# Patient Record
Sex: MALE | Race: BLACK OR AFRICAN AMERICAN | NOT HISPANIC OR LATINO | URBAN - METROPOLITAN AREA
[De-identification: names, ages, dates, MRNs, and addresses within clinical notes are randomized per-mention and may not be internally consistent; named-entity substitution may affect disease eponyms.]

---

## 2020-08-23 ENCOUNTER — INPATIENT (INPATIENT)
Facility: HOSPITAL | Age: 50
LOS: 3 days | Discharge: HOME | End: 2020-08-27
Attending: INTERNAL MEDICINE | Admitting: INTERNAL MEDICINE
Payer: MEDICAID

## 2020-08-23 VITALS
DIASTOLIC BLOOD PRESSURE: 98 MMHG | WEIGHT: 175.05 LBS | OXYGEN SATURATION: 98 % | RESPIRATION RATE: 16 BRPM | HEART RATE: 86 BPM | TEMPERATURE: 98 F | SYSTOLIC BLOOD PRESSURE: 152 MMHG

## 2020-08-23 DIAGNOSIS — I10 ESSENTIAL (PRIMARY) HYPERTENSION: ICD-10-CM

## 2020-08-23 DIAGNOSIS — G83.4 CAUDA EQUINA SYNDROME: ICD-10-CM

## 2020-08-23 DIAGNOSIS — Z98.1 ARTHRODESIS STATUS: ICD-10-CM

## 2020-08-23 DIAGNOSIS — M54.5 LOW BACK PAIN: ICD-10-CM

## 2020-08-23 DIAGNOSIS — M48.061 SPINAL STENOSIS, LUMBAR REGION WITHOUT NEUROGENIC CLAUDICATION: ICD-10-CM

## 2020-08-23 DIAGNOSIS — M51.16 INTERVERTEBRAL DISC DISORDERS WITH RADICULOPATHY, LUMBAR REGION: ICD-10-CM

## 2020-08-23 DIAGNOSIS — M54.9 DORSALGIA, UNSPECIFIED: ICD-10-CM

## 2020-08-23 DIAGNOSIS — M51.9 UNSPECIFIED THORACIC, THORACOLUMBAR AND LUMBOSACRAL INTERVERTEBRAL DISC DISORDER: ICD-10-CM

## 2020-08-23 LAB
AMPHET UR-MCNC: NEGATIVE — SIGNIFICANT CHANGE UP
ANION GAP SERPL CALC-SCNC: 11 MMOL/L — SIGNIFICANT CHANGE UP (ref 5–17)
APTT BLD: 24.9 SEC — LOW (ref 27.5–35.5)
BARBITURATES UR SCN-MCNC: NEGATIVE — SIGNIFICANT CHANGE UP
BENZODIAZ UR-MCNC: NEGATIVE — SIGNIFICANT CHANGE UP
BUN SERPL-MCNC: 13 MG/DL — SIGNIFICANT CHANGE UP (ref 7–23)
CALCIUM SERPL-MCNC: 8.8 MG/DL — SIGNIFICANT CHANGE UP (ref 8.5–10.1)
CHLORIDE SERPL-SCNC: 108 MMOL/L — SIGNIFICANT CHANGE UP (ref 96–108)
CO2 SERPL-SCNC: 23 MMOL/L — SIGNIFICANT CHANGE UP (ref 22–31)
COCAINE METAB.OTHER UR-MCNC: NEGATIVE — SIGNIFICANT CHANGE UP
CREAT SERPL-MCNC: 1.06 MG/DL — SIGNIFICANT CHANGE UP (ref 0.5–1.3)
GLUCOSE SERPL-MCNC: 103 MG/DL — HIGH (ref 70–99)
HCT VFR BLD CALC: 43.8 % — SIGNIFICANT CHANGE UP (ref 39–50)
HGB BLD-MCNC: 15.4 G/DL — SIGNIFICANT CHANGE UP (ref 13–17)
INR BLD: 1.01 RATIO — SIGNIFICANT CHANGE UP (ref 0.88–1.16)
MCHC RBC-ENTMCNC: 32.3 PG — SIGNIFICANT CHANGE UP (ref 27–34)
MCHC RBC-ENTMCNC: 35.2 GM/DL — SIGNIFICANT CHANGE UP (ref 32–36)
MCV RBC AUTO: 91.8 FL — SIGNIFICANT CHANGE UP (ref 80–100)
METHADONE UR-MCNC: NEGATIVE — SIGNIFICANT CHANGE UP
NRBC # BLD: 0 /100 WBCS — SIGNIFICANT CHANGE UP (ref 0–0)
OPIATES UR-MCNC: POSITIVE — SIGNIFICANT CHANGE UP
PCP SPEC-MCNC: SIGNIFICANT CHANGE UP
PCP UR-MCNC: NEGATIVE — SIGNIFICANT CHANGE UP
PLATELET # BLD AUTO: 183 K/UL — SIGNIFICANT CHANGE UP (ref 150–400)
POTASSIUM SERPL-MCNC: 3.6 MMOL/L — SIGNIFICANT CHANGE UP (ref 3.5–5.3)
POTASSIUM SERPL-SCNC: 3.6 MMOL/L — SIGNIFICANT CHANGE UP (ref 3.5–5.3)
PROTHROM AB SERPL-ACNC: 11.7 SEC — SIGNIFICANT CHANGE UP (ref 10.6–13.6)
RBC # BLD: 4.77 M/UL — SIGNIFICANT CHANGE UP (ref 4.2–5.8)
RBC # FLD: 11.6 % — SIGNIFICANT CHANGE UP (ref 10.3–14.5)
SARS-COV-2 RNA SPEC QL NAA+PROBE: SIGNIFICANT CHANGE UP
SODIUM SERPL-SCNC: 142 MMOL/L — SIGNIFICANT CHANGE UP (ref 135–145)
THC UR QL: POSITIVE — SIGNIFICANT CHANGE UP
WBC # BLD: 6.01 K/UL — SIGNIFICANT CHANGE UP (ref 3.8–10.5)
WBC # FLD AUTO: 6.01 K/UL — SIGNIFICANT CHANGE UP (ref 3.8–10.5)

## 2020-08-23 PROCEDURE — 72131 CT LUMBAR SPINE W/O DYE: CPT | Mod: 26

## 2020-08-23 PROCEDURE — 72128 CT CHEST SPINE W/O DYE: CPT | Mod: 26

## 2020-08-23 PROCEDURE — 73562 X-RAY EXAM OF KNEE 3: CPT | Mod: 26,LT

## 2020-08-23 PROCEDURE — 99285 EMERGENCY DEPT VISIT HI MDM: CPT

## 2020-08-23 PROCEDURE — 72148 MRI LUMBAR SPINE W/O DYE: CPT | Mod: 26

## 2020-08-23 PROCEDURE — 72110 X-RAY EXAM L-2 SPINE 4/>VWS: CPT | Mod: 26

## 2020-08-23 RX ORDER — DEXAMETHASONE 0.5 MG/5ML
10 ELIXIR ORAL EVERY 8 HOURS
Refills: 0 | Status: COMPLETED | OUTPATIENT
Start: 2020-08-23 | End: 2020-08-24

## 2020-08-23 RX ORDER — KETAMINE HYDROCHLORIDE 100 MG/ML
24 INJECTION INTRAMUSCULAR; INTRAVENOUS ONCE
Refills: 0 | Status: DISCONTINUED | OUTPATIENT
Start: 2020-08-23 | End: 2020-08-23

## 2020-08-23 RX ORDER — MORPHINE SULFATE 50 MG/1
4 CAPSULE, EXTENDED RELEASE ORAL ONCE
Refills: 0 | Status: DISCONTINUED | OUTPATIENT
Start: 2020-08-23 | End: 2020-08-23

## 2020-08-23 RX ORDER — METHOCARBAMOL 500 MG/1
1500 TABLET, FILM COATED ORAL ONCE
Refills: 0 | Status: COMPLETED | OUTPATIENT
Start: 2020-08-23 | End: 2020-08-23

## 2020-08-23 RX ORDER — SODIUM CHLORIDE 9 MG/ML
1000 INJECTION, SOLUTION INTRAVENOUS
Refills: 0 | Status: DISCONTINUED | OUTPATIENT
Start: 2020-08-23 | End: 2020-08-24

## 2020-08-23 RX ORDER — HYDROMORPHONE HYDROCHLORIDE 2 MG/ML
2 INJECTION INTRAMUSCULAR; INTRAVENOUS; SUBCUTANEOUS EVERY 4 HOURS
Refills: 0 | Status: DISCONTINUED | OUTPATIENT
Start: 2020-08-23 | End: 2020-08-25

## 2020-08-23 RX ORDER — ZOLPIDEM TARTRATE 10 MG/1
5 TABLET ORAL ONCE
Refills: 0 | Status: DISCONTINUED | OUTPATIENT
Start: 2020-08-23 | End: 2020-08-23

## 2020-08-23 RX ORDER — OXYCODONE AND ACETAMINOPHEN 5; 325 MG/1; MG/1
1 TABLET ORAL EVERY 4 HOURS
Refills: 0 | Status: DISCONTINUED | OUTPATIENT
Start: 2020-08-23 | End: 2020-08-25

## 2020-08-23 RX ORDER — MORPHINE SULFATE 50 MG/1
4 CAPSULE, EXTENDED RELEASE ORAL EVERY 4 HOURS
Refills: 0 | Status: DISCONTINUED | OUTPATIENT
Start: 2020-08-23 | End: 2020-08-25

## 2020-08-23 RX ORDER — KETOROLAC TROMETHAMINE 30 MG/ML
15 SYRINGE (ML) INJECTION ONCE
Refills: 0 | Status: DISCONTINUED | OUTPATIENT
Start: 2020-08-23 | End: 2020-08-23

## 2020-08-23 RX ORDER — LIDOCAINE 4 G/100G
1 CREAM TOPICAL ONCE
Refills: 0 | Status: COMPLETED | OUTPATIENT
Start: 2020-08-23 | End: 2020-08-23

## 2020-08-23 RX ORDER — GABAPENTIN 400 MG/1
300 CAPSULE ORAL
Refills: 0 | Status: DISCONTINUED | OUTPATIENT
Start: 2020-08-23 | End: 2020-08-25

## 2020-08-23 RX ADMIN — HYDROMORPHONE HYDROCHLORIDE 2 MILLIGRAM(S): 2 INJECTION INTRAMUSCULAR; INTRAVENOUS; SUBCUTANEOUS at 21:55

## 2020-08-23 RX ADMIN — HYDROMORPHONE HYDROCHLORIDE 2 MILLIGRAM(S): 2 INJECTION INTRAMUSCULAR; INTRAVENOUS; SUBCUTANEOUS at 18:30

## 2020-08-23 RX ADMIN — METHOCARBAMOL 1500 MILLIGRAM(S): 500 TABLET, FILM COATED ORAL at 09:37

## 2020-08-23 RX ADMIN — Medication 102 MILLIGRAM(S): at 15:13

## 2020-08-23 RX ADMIN — LIDOCAINE 1 PATCH: 4 CREAM TOPICAL at 09:36

## 2020-08-23 RX ADMIN — MORPHINE SULFATE 4 MILLIGRAM(S): 50 CAPSULE, EXTENDED RELEASE ORAL at 09:54

## 2020-08-23 RX ADMIN — MORPHINE SULFATE 4 MILLIGRAM(S): 50 CAPSULE, EXTENDED RELEASE ORAL at 10:50

## 2020-08-23 RX ADMIN — MORPHINE SULFATE 4 MILLIGRAM(S): 50 CAPSULE, EXTENDED RELEASE ORAL at 20:32

## 2020-08-23 RX ADMIN — MORPHINE SULFATE 4 MILLIGRAM(S): 50 CAPSULE, EXTENDED RELEASE ORAL at 12:04

## 2020-08-23 RX ADMIN — Medication 15 MILLIGRAM(S): at 09:35

## 2020-08-23 RX ADMIN — LIDOCAINE 1 PATCH: 4 CREAM TOPICAL at 22:32

## 2020-08-23 RX ADMIN — HYDROMORPHONE HYDROCHLORIDE 2 MILLIGRAM(S): 2 INJECTION INTRAMUSCULAR; INTRAVENOUS; SUBCUTANEOUS at 17:51

## 2020-08-23 RX ADMIN — KETAMINE HYDROCHLORIDE 24 MILLIGRAM(S): 100 INJECTION INTRAMUSCULAR; INTRAVENOUS at 12:50

## 2020-08-23 RX ADMIN — Medication 15 MILLIGRAM(S): at 09:55

## 2020-08-23 RX ADMIN — HYDROMORPHONE HYDROCHLORIDE 2 MILLIGRAM(S): 2 INJECTION INTRAMUSCULAR; INTRAVENOUS; SUBCUTANEOUS at 22:10

## 2020-08-23 RX ADMIN — Medication 102 MILLIGRAM(S): at 22:24

## 2020-08-23 RX ADMIN — ZOLPIDEM TARTRATE 5 MILLIGRAM(S): 10 TABLET ORAL at 23:16

## 2020-08-23 RX ADMIN — SODIUM CHLORIDE 70 MILLILITER(S): 9 INJECTION, SOLUTION INTRAVENOUS at 17:51

## 2020-08-23 RX ADMIN — GABAPENTIN 300 MILLIGRAM(S): 400 CAPSULE ORAL at 17:09

## 2020-08-23 RX ADMIN — MORPHINE SULFATE 4 MILLIGRAM(S): 50 CAPSULE, EXTENDED RELEASE ORAL at 15:15

## 2020-08-23 RX ADMIN — MORPHINE SULFATE 4 MILLIGRAM(S): 50 CAPSULE, EXTENDED RELEASE ORAL at 14:34

## 2020-08-23 NOTE — PATIENT PROFILE ADULT - VISION (WITH CORRECTIVE LENSES IF THE PATIENT USUALLY WEARS THEM):
Normal vision: sees adequately in most situations; can see medication labels, newsprint Normal vision: sees adequately in most situations; can see medication labels, newsprint/uses eyeglass

## 2020-08-23 NOTE — CHART NOTE - NSCHARTNOTEFT_GEN_A_CORE
ADMISSION BRIDGE ORDERS     Asked by ED physician, CRYS Velazquez to place bridge orders for Dr. Russo's patient. Per CRYS Velazquez , Dr. Russo will be in within three hours to see and admit pt.     Patient admitted for INTRACTABLE BACK PAIN; DISC DISORDER OF LUMBAR  BACK PAIN    pt c/o excruciating back pain, unable to provide further details.      PAST MEDICAL HISTORY  ====================  HTN (hypertension)  Cervical fusion syndrome      PAST SURGICAL HISTORY  ====================  No significant past surgical history      VITALS  ========  Vital Signs Last 24 Hrs  T(C): 36.6 (23 Aug 2020 09:01), Max: 36.6 (23 Aug 2020 09:01)  T(F): 97.9 (23 Aug 2020 09:01), Max: 97.9 (23 Aug 2020 09:01)  HR: 88 (23 Aug 2020 13:14) (86 - 88)  BP: 180/108 (23 Aug 2020 13:14) (152/98 - 180/108)  BP(mean): --  RR: 18 (23 Aug 2020 13:14) (16 - 18)  SpO2: 100% (23 Aug 2020 13:14) (98% - 100%)  CAPILLARY BLOOD GLUCOSE          LABS  =========  08-23    142  |  108  |  13  ----------------------------<  103<H>  3.6   |  23  |  1.06    Ca    8.8      23 Aug 2020 09:40                            15.4   6.01  )-----------( 183      ( 23 Aug 2020 09:40 )             43.8       PT/INR - ( 23 Aug 2020 09:40 )   PT: 11.7 sec;   INR: 1.01 ratio         PTT - ( 23 Aug 2020 09:40 )  PTT:24.9 sec          Pt seen in stretcher in ED a+o x4. writhing in pain, moving all extremities. Bridge orders entered. Dr. Russo to see and admit pt.

## 2020-08-23 NOTE — H&P ADULT - HISTORY OF PRESENT ILLNESS
Pt is a 50 year old male w/PMH of cervical spine fusion, who presents to the ED today for 2 days of lower left back pain. Pt states pain radiates down left leg

## 2020-08-23 NOTE — CONSULT NOTE ADULT - SUBJECTIVE AND OBJECTIVE BOX
Patient is a 50y Male who presents c/o low back pain radiating to LLE. Pt states he is a  and has history of spine surgery, ACDF done at [facility] years ago. Pt has had low back pain for several years but never had radiating symptoms. A month ago his low back pain flared up again and has persisted until yesterday at which point he started having radiating pain to LLE while he was driving. Pt is now unable to ambulate due to pain and is being admitted to medical service. Denies trauma/fall. Denies pain/injury elsewhere. Positive paresthesias on LLE diffusely, and on RUE that is at baseline and unchanged from his ACDF. States that over the last year he has had difficulty urinating at times that is unchanged in the last month. Denies fevers/chills.    HEALTH ISSUES - PROBLEM Dx:          MEDICATIONS  (STANDING):  dexAMETHasone  IVPB 10 milliGRAM(s) IV Intermittent every 8 hours  gabapentin 300 milliGRAM(s) Oral two times a day      Allergies    No Known Allergies    Intolerances        PAST MEDICAL & SURGICAL HISTORY:  HTN (hypertension)  Cervical fusion syndrome  No significant past surgical history                            15.4   6.01  )-----------( 183      ( 23 Aug 2020 09:40 )             43.8       23 Aug 2020 09:40    142    |  108    |  13     ----------------------------<  103    3.6     |  23     |  1.06     Ca    8.8        23 Aug 2020 09:40        PT/INR - ( 23 Aug 2020 09:40 )   PT: 11.7 sec;   INR: 1.01 ratio         PTT - ( 23 Aug 2020 09:40 )  PTT:24.9 sec        Vital Signs Last 24 Hrs  T(C): 36.6 (08-23-20 @ 09:01), Max: 36.6 (08-23-20 @ 09:01)  T(F): 97.9 (08-23-20 @ 09:01), Max: 97.9 (08-23-20 @ 09:01)  HR: 88 (08-23-20 @ 13:14) (86 - 88)  BP: 180/108 (08-23-20 @ 13:14) (152/98 - 180/108)  BP(mean): --  RR: 18 (08-23-20 @ 13:14) (16 - 18)  SpO2: 100% (08-23-20 @ 13:14) (98% - 100%)    Gen: NAD    Spine PE:  Skin intact  No gross deformity  No midline TTP C/T/L/S spine  No bony step offs  No paraspinal muscle ttp/hypertonicity   Negative clonus  Negative babinski  Negative champagne  + rectal tone  No saddle anesthesia    Motor:                   C5                C6              C7               C8           T1   R            5/5                5/5            5/5             5/5          4/5  L             5/5               5/5             5/5             5/5          5/5                L2             L3             L4               L5            S1  R         5/5           5/5          5/5             5/5           5/5  L          2/5          4/5           4/5             4/5           4/5    Sensory:            C5         C6         C7      C8       T1        (0=absent, 1=impaired, 2=normal, NT=not testable)  R         1            1           1        1         1  L          2            2           2        2         2               L2          L3         L4      L5       S1         (0=absent, 1=impaired, 2=normal, NT=not testable)  R         2            2            2        2        2  L          2            2           1        1         1    2+ DP/PT pulses BL    L knee  skin with some edema   - TTP  0-130 ROM    Imaging:  CT lumbar spine - L4-5 HNP with left foraminal stenosis  MRI lumbar spine - L4-5 HNP with left foraminal stenosis; mild disc bulge at L4-5 broad based with left > right     A/P: 50y Male with L4/5 HNP with radiculopathy    Discussed with patient, being admitted to medicine for inability to ambulate, will attempt non operative management with steroids rest and gabapentin for pain control and re-evaluate in AM; may require surgical intervention to decompress foramen if no improvement on elective basis  Decadron 10mg q8 x 24 hours  Pain control; gabapentin 300 BID  WBAT with assistive devices as needed  SCDs  Medical management per primary team  Discussed with Dr. Faulkner who agrees with plan    Ricky Lamb,   PGY2, Orthopaedic Surgery Patient is a 50y Male who presents c/o low back pain radiating to LLE. Pt states he is a  and has history of spine surgery, ACDF done at [facility] years ago. Pt has had low back pain for several years but never had radiating symptoms. A month ago his low back pain flared up again and has persisted until yesterday at which point he started having radiating pain to LLE while he was driving. Pt is now unable to ambulate due to pain and is being admitted to medical service. Denies trauma/fall. Denies pain/injury elsewhere. Positive paresthesias on LLE diffusely, and on RUE that is at baseline and unchanged from his ACDF. States that over the last year he has had difficulty urinating at times that is unchanged in the last month. Denies fevers/chills.    HEALTH ISSUES - PROBLEM Dx:          MEDICATIONS  (STANDING):  dexAMETHasone  IVPB 10 milliGRAM(s) IV Intermittent every 8 hours  gabapentin 300 milliGRAM(s) Oral two times a day      Allergies    No Known Allergies    Intolerances        PAST MEDICAL & SURGICAL HISTORY:  HTN (hypertension)  Cervical fusion syndrome  No significant past surgical history                            15.4   6.01  )-----------( 183      ( 23 Aug 2020 09:40 )             43.8       23 Aug 2020 09:40    142    |  108    |  13     ----------------------------<  103    3.6     |  23     |  1.06     Ca    8.8        23 Aug 2020 09:40        PT/INR - ( 23 Aug 2020 09:40 )   PT: 11.7 sec;   INR: 1.01 ratio         PTT - ( 23 Aug 2020 09:40 )  PTT:24.9 sec        Vital Signs Last 24 Hrs  T(C): 36.6 (08-23-20 @ 09:01), Max: 36.6 (08-23-20 @ 09:01)  T(F): 97.9 (08-23-20 @ 09:01), Max: 97.9 (08-23-20 @ 09:01)  HR: 88 (08-23-20 @ 13:14) (86 - 88)  BP: 180/108 (08-23-20 @ 13:14) (152/98 - 180/108)  BP(mean): --  RR: 18 (08-23-20 @ 13:14) (16 - 18)  SpO2: 100% (08-23-20 @ 13:14) (98% - 100%)    Gen: NAD    Spine PE:  Skin intact  No gross deformity  No midline TTP C/T/L/S spine  No bony step offs  No paraspinal muscle ttp/hypertonicity   Negative clonus  Negative babinski  Negative champagne  + rectal tone  No saddle anesthesia    Motor:                   C5                C6              C7               C8           T1   R            5/5                5/5            5/5             5/5          4/5  L             5/5               5/5             5/5             5/5          5/5                L2             L3             L4               L5            S1  R         5/5           5/5          5/5             5/5           5/5  L          2/5          4/5           4/5             4/5           4/5    Sensory:            C5         C6         C7      C8       T1        (0=absent, 1=impaired, 2=normal, NT=not testable)  R         1            1           1        1         1  L          2            2           2        2         2               L2          L3         L4      L5       S1         (0=absent, 1=impaired, 2=normal, NT=not testable)  R         2            2            2        2        2  L          1            1           1        1         1    2+ DP/PT pulses BL    L knee  skin with some edema   - TTP  0-130 ROM    Imaging:  CT lumbar spine - L4-5 HNP with left foraminal stenosis  MRI lumbar spine - Large extruded herniation at L2-3 causing severe spinal stenosis and smaller mild herniation at L4-5.      A/P: 50y Male with leg weakness, inability to ambulate, numbness in left leg    Discussed with patient, being admitted to medicine for inability to ambulate, will attempt non operative management with steroids rest and gabapentin for pain control and re-evaluate in AM; may require surgical intervention to decompress foramen if no improvement on elective basis  Decadron 10mg q8 x 24 hours  Pain control; gabapentin 300 BID  WBAT with assistive devices as needed  SCDs  Medical management per primary team  Discussed with Dr. Faulkner who agrees with plan    Ricky Lamb,   PGY2, Orthopaedic Surgery

## 2020-08-23 NOTE — ED ADULT NURSE NOTE - NSIMPLEMENTINTERV_GEN_ALL_ED
Implemented All Fall with Harm Risk Interventions:  [place] to call system. Call bell, personal items and telephone within reach. Instruct patient to call for assistance. Room bathroom lighting operational. Non-slip footwear when patient is off stretcher. Physically safe environment: no spills, clutter or unnecessary equipment. Stretcher in lowest position, wheels locked, appropriate side rails in place. Provide visual cue, wrist band, yellow gown, etc. Monitor gait and stability. Monitor for mental status changes and reorient to person, place, and time. Review medications for side effects contributing to fall risk. Reinforce activity limits and safety measures with patient and family. Provide visual clues: red socks.

## 2020-08-23 NOTE — ED PROVIDER NOTE - PROGRESS NOTE DETAILS
patient continues to be in pain in ED; ct with foraminal compression by dis bulge. mild central canal noted on CT0- patient remains without neurologic deficit. will admit for intractable back pain, inability to walk, and pt eval

## 2020-08-23 NOTE — ED ADULT TRIAGE NOTE - CHIEF COMPLAINT QUOTE
Pt yelling in pain uncooperative c/o left lower back pain radiating down left leg onset last night progressively worse denies known injury

## 2020-08-23 NOTE — PATIENT PROFILE ADULT - MONEY FOR FOOD

## 2020-08-23 NOTE — ED PROVIDER NOTE - OBJECTIVE STATEMENT
Pt is a 50 year old male w/PMH of cervical spine fusion, who presents to the ED today for 2 days of lower left back pain. Pt states pain radiates down left leg. Denies any recent illness, trauma, IV DU, sensation deficits, weakness, changes in urinary patterns, or abdominal pain. Patient denies EtOH/tobacco/illicit substance use.

## 2020-08-23 NOTE — ED PROVIDER NOTE - DISPOSITION TYPE
.  Chief Complaint   Patient presents with   • Hip Pain     right hip started hurting yesterday while I was at work. No injury noted. Pain starts in hip and shoots down rt leg. Standing on it makes it worse, then it turns numb.   • Back Pain     x 2 weeks.       ADMIT

## 2020-08-23 NOTE — ED PROVIDER NOTE - CLINICAL SUMMARY MEDICAL DECISION MAKING FREE TEXT BOX
Plan: Pt with no signs of neurologic compromise, no clinical sign of cord compression will empirically treat for likely sciatica, and provide ortho follow up

## 2020-08-23 NOTE — ED PROVIDER NOTE - MUSCULOSKELETAL, MLM
Spine appears normal, range of motion is not limited, no muscle or joint tenderness, no swelling to the back, erythema, or ecchymosis, nontender to palpation, left leg ROM limited by pain, no saddle anesthesia, distal sensation extremities intact

## 2020-08-23 NOTE — H&P ADULT - NSHPLABSRESULTS_GEN_ALL_CORE
15.4   6.01  )-----------( 183      ( 23 Aug 2020 09:40 )             43.8     08-23    142  |  108  |  13  ----------------------------<  103<H>  3.6   |  23  |  1.06    Ca    8.8      23 Aug 2020 09:40      PT/INR - ( 23 Aug 2020 09:40 )   PT: 11.7 sec;   INR: 1.01 ratio         PTT - ( 23 Aug 2020 09:40 )  PTT:24.9 sec

## 2020-08-23 NOTE — ED PROVIDER NOTE - CONSTITUTIONAL, MLM
normal... Uncomfortable looking, awake, alert, oriented to person, place, time/situation and in no apparent distress.

## 2020-08-24 LAB
ANION GAP SERPL CALC-SCNC: 7 MMOL/L — SIGNIFICANT CHANGE UP (ref 5–17)
BUN SERPL-MCNC: 10 MG/DL — SIGNIFICANT CHANGE UP (ref 7–23)
CALCIUM SERPL-MCNC: 8.6 MG/DL — SIGNIFICANT CHANGE UP (ref 8.5–10.1)
CHLORIDE SERPL-SCNC: 103 MMOL/L — SIGNIFICANT CHANGE UP (ref 96–108)
CO2 SERPL-SCNC: 24 MMOL/L — SIGNIFICANT CHANGE UP (ref 22–31)
CREAT SERPL-MCNC: 0.8 MG/DL — SIGNIFICANT CHANGE UP (ref 0.5–1.3)
GLUCOSE SERPL-MCNC: 138 MG/DL — HIGH (ref 70–99)
HCT VFR BLD CALC: 45.2 % — SIGNIFICANT CHANGE UP (ref 39–50)
HGB BLD-MCNC: 15.5 G/DL — SIGNIFICANT CHANGE UP (ref 13–17)
MCHC RBC-ENTMCNC: 31.9 PG — SIGNIFICANT CHANGE UP (ref 27–34)
MCHC RBC-ENTMCNC: 34.3 GM/DL — SIGNIFICANT CHANGE UP (ref 32–36)
MCV RBC AUTO: 93 FL — SIGNIFICANT CHANGE UP (ref 80–100)
NRBC # BLD: 0 /100 WBCS — SIGNIFICANT CHANGE UP (ref 0–0)
PLATELET # BLD AUTO: 177 K/UL — SIGNIFICANT CHANGE UP (ref 150–400)
POTASSIUM SERPL-MCNC: 4.1 MMOL/L — SIGNIFICANT CHANGE UP (ref 3.5–5.3)
POTASSIUM SERPL-SCNC: 4.1 MMOL/L — SIGNIFICANT CHANGE UP (ref 3.5–5.3)
RBC # BLD: 4.86 M/UL — SIGNIFICANT CHANGE UP (ref 4.2–5.8)
RBC # FLD: 11.3 % — SIGNIFICANT CHANGE UP (ref 10.3–14.5)
SARS-COV-2 IGG SERPL QL IA: NEGATIVE — SIGNIFICANT CHANGE UP
SARS-COV-2 IGM SERPL IA-ACNC: 0.08 INDEX — SIGNIFICANT CHANGE UP
SODIUM SERPL-SCNC: 134 MMOL/L — LOW (ref 135–145)
WBC # BLD: 8.57 K/UL — SIGNIFICANT CHANGE UP (ref 3.8–10.5)
WBC # FLD AUTO: 8.57 K/UL — SIGNIFICANT CHANGE UP (ref 3.8–10.5)

## 2020-08-24 PROCEDURE — 93010 ELECTROCARDIOGRAM REPORT: CPT

## 2020-08-24 PROCEDURE — 71045 X-RAY EXAM CHEST 1 VIEW: CPT | Mod: 26

## 2020-08-24 RX ORDER — CYCLOBENZAPRINE HYDROCHLORIDE 10 MG/1
10 TABLET, FILM COATED ORAL ONCE
Refills: 0 | Status: COMPLETED | OUTPATIENT
Start: 2020-08-24 | End: 2020-08-24

## 2020-08-24 RX ORDER — HEPARIN SODIUM 5000 [USP'U]/ML
5000 INJECTION INTRAVENOUS; SUBCUTANEOUS EVERY 8 HOURS
Refills: 0 | Status: COMPLETED | OUTPATIENT
Start: 2020-08-24 | End: 2020-08-24

## 2020-08-24 RX ORDER — DEXAMETHASONE 0.5 MG/5ML
10 ELIXIR ORAL EVERY 8 HOURS
Refills: 0 | Status: COMPLETED | OUTPATIENT
Start: 2020-08-24 | End: 2020-08-25

## 2020-08-24 RX ORDER — LANOLIN ALCOHOL/MO/W.PET/CERES
3 CREAM (GRAM) TOPICAL AT BEDTIME
Refills: 0 | Status: DISCONTINUED | OUTPATIENT
Start: 2020-08-24 | End: 2020-08-25

## 2020-08-24 RX ORDER — LIDOCAINE 4 G/100G
1 CREAM TOPICAL ONCE
Refills: 0 | Status: COMPLETED | OUTPATIENT
Start: 2020-08-24 | End: 2020-08-24

## 2020-08-24 RX ORDER — SENNA PLUS 8.6 MG/1
2 TABLET ORAL AT BEDTIME
Refills: 0 | Status: DISCONTINUED | OUTPATIENT
Start: 2020-08-24 | End: 2020-08-25

## 2020-08-24 RX ORDER — DEXAMETHASONE 0.5 MG/5ML
10 ELIXIR ORAL EVERY 8 HOURS
Refills: 0 | Status: DISCONTINUED | OUTPATIENT
Start: 2020-08-24 | End: 2020-08-24

## 2020-08-24 RX ORDER — POLYETHYLENE GLYCOL 3350 17 G/17G
17 POWDER, FOR SOLUTION ORAL ONCE
Refills: 0 | Status: COMPLETED | OUTPATIENT
Start: 2020-08-24 | End: 2020-08-24

## 2020-08-24 RX ORDER — SODIUM CHLORIDE 9 MG/ML
1000 INJECTION, SOLUTION INTRAVENOUS
Refills: 0 | Status: DISCONTINUED | OUTPATIENT
Start: 2020-08-24 | End: 2020-08-25

## 2020-08-24 RX ORDER — SODIUM CHLORIDE 9 MG/ML
1000 INJECTION, SOLUTION INTRAVENOUS
Refills: 0 | Status: DISCONTINUED | OUTPATIENT
Start: 2020-08-24 | End: 2020-08-26

## 2020-08-24 RX ORDER — SODIUM CHLORIDE 9 MG/ML
1000 INJECTION, SOLUTION INTRAVENOUS
Refills: 0 | Status: DISCONTINUED | OUTPATIENT
Start: 2020-08-24 | End: 2020-08-24

## 2020-08-24 RX ADMIN — MORPHINE SULFATE 4 MILLIGRAM(S): 50 CAPSULE, EXTENDED RELEASE ORAL at 08:00

## 2020-08-24 RX ADMIN — SODIUM CHLORIDE 70 MILLILITER(S): 9 INJECTION, SOLUTION INTRAVENOUS at 14:38

## 2020-08-24 RX ADMIN — POLYETHYLENE GLYCOL 3350 17 GRAM(S): 17 POWDER, FOR SOLUTION ORAL at 22:30

## 2020-08-24 RX ADMIN — LIDOCAINE 1 PATCH: 4 CREAM TOPICAL at 14:14

## 2020-08-24 RX ADMIN — MORPHINE SULFATE 4 MILLIGRAM(S): 50 CAPSULE, EXTENDED RELEASE ORAL at 01:00

## 2020-08-24 RX ADMIN — Medication 102 MILLIGRAM(S): at 21:49

## 2020-08-24 RX ADMIN — CYCLOBENZAPRINE HYDROCHLORIDE 10 MILLIGRAM(S): 10 TABLET, FILM COATED ORAL at 16:07

## 2020-08-24 RX ADMIN — MORPHINE SULFATE 4 MILLIGRAM(S): 50 CAPSULE, EXTENDED RELEASE ORAL at 13:16

## 2020-08-24 RX ADMIN — HYDROMORPHONE HYDROCHLORIDE 2 MILLIGRAM(S): 2 INJECTION INTRAMUSCULAR; INTRAVENOUS; SUBCUTANEOUS at 15:23

## 2020-08-24 RX ADMIN — Medication 102 MILLIGRAM(S): at 05:57

## 2020-08-24 RX ADMIN — Medication 3 MILLIGRAM(S): at 22:30

## 2020-08-24 RX ADMIN — HYDROMORPHONE HYDROCHLORIDE 2 MILLIGRAM(S): 2 INJECTION INTRAMUSCULAR; INTRAVENOUS; SUBCUTANEOUS at 05:10

## 2020-08-24 RX ADMIN — LIDOCAINE 1 PATCH: 4 CREAM TOPICAL at 19:48

## 2020-08-24 RX ADMIN — HYDROMORPHONE HYDROCHLORIDE 2 MILLIGRAM(S): 2 INJECTION INTRAMUSCULAR; INTRAVENOUS; SUBCUTANEOUS at 23:45

## 2020-08-24 RX ADMIN — OXYCODONE AND ACETAMINOPHEN 1 TABLET(S): 5; 325 TABLET ORAL at 09:35

## 2020-08-24 RX ADMIN — HEPARIN SODIUM 5000 UNIT(S): 5000 INJECTION INTRAVENOUS; SUBCUTANEOUS at 21:49

## 2020-08-24 RX ADMIN — MORPHINE SULFATE 4 MILLIGRAM(S): 50 CAPSULE, EXTENDED RELEASE ORAL at 00:37

## 2020-08-24 RX ADMIN — OXYCODONE AND ACETAMINOPHEN 1 TABLET(S): 5; 325 TABLET ORAL at 22:50

## 2020-08-24 RX ADMIN — MORPHINE SULFATE 4 MILLIGRAM(S): 50 CAPSULE, EXTENDED RELEASE ORAL at 19:42

## 2020-08-24 RX ADMIN — GABAPENTIN 300 MILLIGRAM(S): 400 CAPSULE ORAL at 17:27

## 2020-08-24 RX ADMIN — HYDROMORPHONE HYDROCHLORIDE 2 MILLIGRAM(S): 2 INJECTION INTRAMUSCULAR; INTRAVENOUS; SUBCUTANEOUS at 10:51

## 2020-08-24 RX ADMIN — HYDROMORPHONE HYDROCHLORIDE 2 MILLIGRAM(S): 2 INJECTION INTRAMUSCULAR; INTRAVENOUS; SUBCUTANEOUS at 04:48

## 2020-08-24 RX ADMIN — Medication 102 MILLIGRAM(S): at 16:07

## 2020-08-24 RX ADMIN — OXYCODONE AND ACETAMINOPHEN 1 TABLET(S): 5; 325 TABLET ORAL at 21:50

## 2020-08-24 RX ADMIN — GABAPENTIN 300 MILLIGRAM(S): 400 CAPSULE ORAL at 05:57

## 2020-08-24 RX ADMIN — SODIUM CHLORIDE 70 MILLILITER(S): 9 INJECTION, SOLUTION INTRAVENOUS at 06:00

## 2020-08-24 RX ADMIN — MORPHINE SULFATE 4 MILLIGRAM(S): 50 CAPSULE, EXTENDED RELEASE ORAL at 20:02

## 2020-08-24 NOTE — PROGRESS NOTE ADULT - SUBJECTIVE AND OBJECTIVE BOX
Pt seen and examined at bedside, resting comfortably. No acute events overnight. Pt reports feeling better this morning. Reports some numbness tingling on RUE and LLE.    T(C): 36.8 (08-24-20 @ 00:04), Max: 36.8 (08-23-20 @ 17:33)  HR: 69 (08-24-20 @ 00:04) (69 - 88)  BP: 168/97 (08-24-20 @ 00:04) (145/90 - 180/108)  RR: 17 (08-24-20 @ 00:04) (16 - 18)  SpO2: 100% (08-24-20 @ 00:04) (97% - 100%)                          15.4   6.01  )-----------( 183      ( 23 Aug 2020 09:40 )             43.8     23 Aug 2020 09:40    142    |  108    |  13     ----------------------------<  103    3.6     |  23     |  1.06     Ca    8.8        23 Aug 2020 09:40      Gen: NAD    Spine PE:  Skin intact  No gross deformity  No midline TTP C/T/L/S spine  No bony step offs  No paraspinal muscle ttp/hypertonicity   Negative clonus  Negative babinski  Negative champagne    Motor:                   C5                C6              C7               C8           T1   R            5/5                5/5            5/5             5/5          4/5  L             5/5               5/5             5/5             5/5          5/5                L2             L3             L4               L5            S1  R         5/5           5/5          5/5             5/5           5/5  L          5/5          5/5           5/5             5/5           5/5    Sensory:            C5         C6         C7      C8       T1        (0=absent, 1=impaired, 2=normal, NT=not testable)  R         1            1           1        1         1  L          2            2           2        2         2               L2          L3         L4      L5       S1         (0=absent, 1=impaired, 2=normal, NT=not testable)  R         2            2            2        2        2  L          2            2           1        2         2    2+ DP/PT pulses BL    A/P: 50y Male with L2/3 HNP, L4/5 HNP with radiculopathy    Patient improving on decadron/gabapentin; will continue to monitor and re-evaluate; likely will be stable for discharge and will need follow up outpatient with Dr. Faulkner  Pain control; gabapentin 300 BID  WBAT with assistive devices as needed  SCDs  Medical management per primary team  Will discuss with Dr. Faulkner and advise if plan changes    Ricky Lamb, DO  PGY2, Orthopaedic Surgery Pt seen and examined at bedside, resting comfortably. No acute events overnight. Pt reports feeling better this morning. Reports some numbness tingling on RUE and LLE.  He reports he will work with PT today.    T(C): 36.8 (08-24-20 @ 00:04), Max: 36.8 (08-23-20 @ 17:33)  HR: 69 (08-24-20 @ 00:04) (69 - 88)  BP: 168/97 (08-24-20 @ 00:04) (145/90 - 180/108)  RR: 17 (08-24-20 @ 00:04) (16 - 18)  SpO2: 100% (08-24-20 @ 00:04) (97% - 100%)                          15.4   6.01  )-----------( 183      ( 23 Aug 2020 09:40 )             43.8     23 Aug 2020 09:40    142    |  108    |  13     ----------------------------<  103    3.6     |  23     |  1.06     Ca    8.8        23 Aug 2020 09:40      Gen: NAD    Spine PE:  Skin intact  No gross deformity  No midline TTP C/T/L/S spine  No bony step offs  No paraspinal muscle ttp/hypertonicity   Negative clonus  Negative babinski  Negative champagne    Motor:                   C5                C6              C7               C8           T1   R            5/5                5/5            5/5             5/5          4/5  L             5/5               5/5             5/5             5/5          5/5                L2             L3             L4               L5            S1  R         5/5           5/5          5/5             5/5           5/5  L          3/5          3/5           4/5             5/5           5/5    Sensory:            C5         C6         C7      C8       T1        (0=absent, 1=impaired, 2=normal, NT=not testable)  R         1            1           1        1         1  L          2            2           2        2         2               L2          L3         L4      L5       S1         (0=absent, 1=impaired, 2=normal, NT=not testable)  R         2            2            2        2        2  L          1            1           1        2         2    2+ DP/PT pulses BL    A/P: 50y Male with L2/3 HNP, L4/5 HNP with radiculopathy    Patient improving on decadron/gabapentin; will continue to monitor and re-evaluate; likely will be stable for discharge and will need follow up outpatient with Dr. Faulkner  Pain control; gabapentin 300 BID  WBAT with assistive devices as needed  SCDs  Medical management per primary team  Will discuss with Dr. Faulkner and advise if plan changes    Ricky Lamb, DO  PGY2, Orthopaedic Surgery

## 2020-08-24 NOTE — PROGRESS NOTE ADULT - SUBJECTIVE AND OBJECTIVE BOX
Patient is a 50y old  Male who presents with a chief complaint of low back pain (24 Aug 2020 05:47)      INTERVAL HPI/OVERNIGHT EVENTS:  pt still c/o low back pain  MEDICATIONS  (STANDING):  gabapentin 300 milliGRAM(s) Oral two times a day  lactated ringers. 1000 milliLiter(s) (75 mL/Hr) IV Continuous <Continuous>  sodium chloride 0.45%. 1000 milliLiter(s) (70 mL/Hr) IV Continuous <Continuous>    MEDICATIONS  (PRN):  HYDROmorphone  Injectable 2 milliGRAM(s) IV Push every 4 hours PRN Severe Pain (7 - 10)  morphine  - Injectable 4 milliGRAM(s) IV Push every 4 hours PRN Severe Pain (7 - 10)  oxycodone    5 mG/acetaminophen 325 mG 1 Tablet(s) Oral every 4 hours PRN Moderate Pain (4 - 6)      Allergies    Drug Allergies Not Recorded  shellfish (Short breath)    Intolerances        REVIEW OF SYSTEMS:  CONSTITUTIONAL: No fever, weight loss, or fatigue  EYES: No eye pain, visual disturbances, or discharge  ENMT:  No difficulty hearing, tinnitus, vertigo; No sinus or throat pain  NECK: No pain or stiffness  BREASTS: No pain, masses, or nipple discharge  RESPIRATORY: No cough, wheezing, chills or hemoptysis; No shortness of breath  CARDIOVASCULAR: No chest pain, palpitations, dizziness, or leg swelling  GASTROINTESTINAL: No abdominal or epigastric pain. No nausea, vomiting, or hematemesis; No diarrhea or constipation. No melena or hematochezia.  GENITOURINARY: No dysuria, frequency, hematuria, or incontinence  NEUROLOGICAL: No headaches, memory loss, loss of strength, numbness, or tremors  SKIN: No itching, burning, rashes, or lesions   LYMPH NODES: No enlarged glands  ENDOCRINE: No heat or cold intolerance; No hair loss  MUSCULOSKELETAL: No joint pain or swelling; No muscle, back, or extremity pain  PSYCHIATRIC: No depression, anxiety, mood swings, or difficulty sleeping  HEME/LYMPH: No easy bruising, or bleeding gums  ALLERGY AND IMMUNOLOGIC: No hives or eczema    Vital Signs Last 24 Hrs  T(C): 36.8 (24 Aug 2020 05:45), Max: 36.8 (23 Aug 2020 17:33)  T(F): 98.3 (24 Aug 2020 05:45), Max: 98.3 (23 Aug 2020 17:33)  HR: 76 (24 Aug 2020 05:45) (69 - 88)  BP: 150/100 (24 Aug 2020 05:45) (145/90 - 180/108)  BP(mean): --  RR: 17 (24 Aug 2020 05:45) (16 - 18)  SpO2: 97% (24 Aug 2020 05:45) (97% - 100%)    PHYSICAL EXAM:  GENERAL: NAD, well-groomed, well-developed  HEAD:  Atraumatic, Normocephalic  EYES: EOMI, PERRLA, conjunctiva and sclera clear  ENMT: No tonsillar erythema, exudates, or enlargement; Moist mucous membranes, Good dentition, No lesions  NECK: Supple, No JVD, Normal thyroid  NERVOUS SYSTEM:  Alert & Oriented X3, Good concentration; Motor Strength 5/5 B/L upper and lower extremities; DTRs 2+ intact and symmetric  CHEST/LUNG: Clear to percussion bilaterally; No rales, rhonchi, wheezing, or rubs  HEART: Regular rate and rhythm; No murmurs, rubs, or gallops  ABDOMEN: Soft, Nontender, Nondistended; Bowel sounds present  EXTREMITIES:  2+ Peripheral Pulses, No clubbing, cyanosis, or edema  LYMPH: No lymphadenopathy noted  SKIN: No rashes or lesions    LABS:                        15.5   8.57  )-----------( 177      ( 24 Aug 2020 06:26 )             45.2     08-24    134<L>  |  103  |  10  ----------------------------<  138<H>  4.1   |  24  |  0.80    Ca    8.6      24 Aug 2020 06:28      PT/INR - ( 23 Aug 2020 09:40 )   PT: 11.7 sec;   INR: 1.01 ratio         PTT - ( 23 Aug 2020 09:40 )  PTT:24.9 sec    CAPILLARY BLOOD GLUCOSE        CULTURES:    HEMOGLOBIN A1C:    CHOLESTEROL:        RADIOLOGY & ADDITIONAL TESTS:    < from: MR Lumbar Spine No Cont (08.23.20 @ 13:56) >  EXAM:  MR SPINE LUMBAR                            PROCEDURE DATE:  08/23/2020          INTERPRETATION:  CLINICAL INDICATION: Lower back pain, radiculopathy.    TECHNIQUE: Multiplanar multisequence MRI of the lumbar spine was performed without the administration of intravenous contrast, according to standard protocol.    COMPARISON: CT lumbar spine 8/23/2020.    FINDINGS:    ALIGNMENT: Straightening of the expected lumbar lordosis, without listhesis.    VERTEBRAE: The vertebral bodies are normalin height. There is no evidence of fracture or aggressive osseous lesion.    DISCS: There is multilevel mild degenerative loss of intervertebral disc space.    CONUS MEDULLARIS AND CAUDA EQUINA: The conus medullaris terminates at L1. There is normal appearance of the conus medullaris and cauda equina.    PARAVERTEBRAL SOFT TISSUES AND VISUALIZED RETROPERITONEUM: The visualized paravertebral soft tissues appear within normal limits.    EVALUATION OF INDIVIDUAL LEVELS:  L1-2: Small central disc protrusion and degenerative changes of the bilateral facet joints resulting in minimal canal stenosis. No neuroforaminal narrowing.    L2-3: Disc bulge with superimposed central disc extrusion which extends superiorly for 2.5 cm in length (series 3, image series 6 images 6-10), resulting in moderate canal stenosis at the level of L2 vertebral body and very mild neuroforaminal narrowing bilaterally.    L3-4: No spinal canal or neuroforaminal stenosis.    L4-5: There is mild disc bulge asymmetric to the left and degenerative changes of the bilateral facet joints resulting in mild canal stenosis, moderate to severe left and mild right neuroforaminal narrowing. There is mass effect on the exiting left L4 nerve root (series 4 image 4).    L5-S1: No spinal canal or neuroforaminal stenosis.    LIMITED EVALUATION OF UPPER SACRUM AND SACROILIAC JOINTS: Unremarkable.    IMPRESSION:    At L2-L3 level, there is disc bulge with superimposed central disc extrusion extending superiorly for 2.5 cm in length resulting in moderate canal stenosis at the level of the L2 vertebral body. Moderate to severe left neuroforaminal narrowing at L4-L5 level, with mass effect on the exiting left L4 nerve root.    Correlation with symptoms and clinical exam findings isadvised.              DUNG ANDINO M.D., ATTENDING RADIOLOGIST  This document has been electronically signed. Aug 23 2020  3:17PM              < end of copied text >

## 2020-08-25 LAB
ANION GAP SERPL CALC-SCNC: 7 MMOL/L — SIGNIFICANT CHANGE UP (ref 5–17)
APTT BLD: 27.7 SEC — SIGNIFICANT CHANGE UP (ref 27.5–35.5)
BUN SERPL-MCNC: 12 MG/DL — SIGNIFICANT CHANGE UP (ref 7–23)
CALCIUM SERPL-MCNC: 8.8 MG/DL — SIGNIFICANT CHANGE UP (ref 8.5–10.1)
CHLORIDE SERPL-SCNC: 104 MMOL/L — SIGNIFICANT CHANGE UP (ref 96–108)
CO2 SERPL-SCNC: 29 MMOL/L — SIGNIFICANT CHANGE UP (ref 22–31)
CREAT SERPL-MCNC: 0.78 MG/DL — SIGNIFICANT CHANGE UP (ref 0.5–1.3)
GLUCOSE SERPL-MCNC: 133 MG/DL — HIGH (ref 70–99)
HCT VFR BLD CALC: 46 % — SIGNIFICANT CHANGE UP (ref 39–50)
HGB BLD-MCNC: 15.8 G/DL — SIGNIFICANT CHANGE UP (ref 13–17)
INR BLD: 1 RATIO — SIGNIFICANT CHANGE UP (ref 0.88–1.16)
MCHC RBC-ENTMCNC: 32.1 PG — SIGNIFICANT CHANGE UP (ref 27–34)
MCHC RBC-ENTMCNC: 34.3 GM/DL — SIGNIFICANT CHANGE UP (ref 32–36)
MCV RBC AUTO: 93.5 FL — SIGNIFICANT CHANGE UP (ref 80–100)
NRBC # BLD: 0 /100 WBCS — SIGNIFICANT CHANGE UP (ref 0–0)
PLATELET # BLD AUTO: 177 K/UL — SIGNIFICANT CHANGE UP (ref 150–400)
POTASSIUM SERPL-MCNC: 4.2 MMOL/L — SIGNIFICANT CHANGE UP (ref 3.5–5.3)
POTASSIUM SERPL-SCNC: 4.2 MMOL/L — SIGNIFICANT CHANGE UP (ref 3.5–5.3)
PROTHROM AB SERPL-ACNC: 11.6 SEC — SIGNIFICANT CHANGE UP (ref 10.6–13.6)
RBC # BLD: 4.92 M/UL — SIGNIFICANT CHANGE UP (ref 4.2–5.8)
RBC # FLD: 11.6 % — SIGNIFICANT CHANGE UP (ref 10.3–14.5)
SODIUM SERPL-SCNC: 140 MMOL/L — SIGNIFICANT CHANGE UP (ref 135–145)
WBC # BLD: 15.38 K/UL — HIGH (ref 3.8–10.5)
WBC # FLD AUTO: 15.38 K/UL — HIGH (ref 3.8–10.5)

## 2020-08-25 RX ORDER — ASCORBIC ACID 60 MG
500 TABLET,CHEWABLE ORAL
Refills: 0 | Status: DISCONTINUED | OUTPATIENT
Start: 2020-08-26 | End: 2020-08-27

## 2020-08-25 RX ORDER — PANTOPRAZOLE SODIUM 20 MG/1
40 TABLET, DELAYED RELEASE ORAL ONCE
Refills: 0 | Status: COMPLETED | OUTPATIENT
Start: 2020-08-25 | End: 2020-08-25

## 2020-08-25 RX ORDER — BENZOCAINE AND MENTHOL 5; 1 G/100ML; G/100ML
1 LIQUID ORAL
Refills: 0 | Status: DISCONTINUED | OUTPATIENT
Start: 2020-08-26 | End: 2020-08-27

## 2020-08-25 RX ORDER — ACETAMINOPHEN 500 MG
650 TABLET ORAL EVERY 6 HOURS
Refills: 0 | Status: DISCONTINUED | OUTPATIENT
Start: 2020-08-26 | End: 2020-08-27

## 2020-08-25 RX ORDER — MAGNESIUM HYDROXIDE 400 MG/1
30 TABLET, CHEWABLE ORAL EVERY 12 HOURS
Refills: 0 | Status: DISCONTINUED | OUTPATIENT
Start: 2020-08-26 | End: 2020-08-27

## 2020-08-25 RX ORDER — FAMOTIDINE 10 MG/ML
20 INJECTION INTRAVENOUS EVERY 12 HOURS
Refills: 0 | Status: DISCONTINUED | OUTPATIENT
Start: 2020-08-26 | End: 2020-08-27

## 2020-08-25 RX ORDER — LANOLIN ALCOHOL/MO/W.PET/CERES
3 CREAM (GRAM) TOPICAL AT BEDTIME
Refills: 0 | Status: DISCONTINUED | OUTPATIENT
Start: 2020-08-26 | End: 2020-08-27

## 2020-08-25 RX ORDER — CEFAZOLIN SODIUM 1 G
2000 VIAL (EA) INJECTION EVERY 8 HOURS
Refills: 0 | Status: DISCONTINUED | OUTPATIENT
Start: 2020-08-26 | End: 2020-08-27

## 2020-08-25 RX ORDER — FOLIC ACID 0.8 MG
1 TABLET ORAL DAILY
Refills: 0 | Status: DISCONTINUED | OUTPATIENT
Start: 2020-08-26 | End: 2020-08-27

## 2020-08-25 RX ORDER — GABAPENTIN 400 MG/1
300 CAPSULE ORAL
Refills: 0 | Status: DISCONTINUED | OUTPATIENT
Start: 2020-08-26 | End: 2020-08-27

## 2020-08-25 RX ORDER — ONDANSETRON 8 MG/1
4 TABLET, FILM COATED ORAL EVERY 6 HOURS
Refills: 0 | Status: DISCONTINUED | OUTPATIENT
Start: 2020-08-26 | End: 2020-08-27

## 2020-08-25 RX ORDER — DIAZEPAM 5 MG
5 TABLET ORAL EVERY 6 HOURS
Refills: 0 | Status: DISCONTINUED | OUTPATIENT
Start: 2020-08-26 | End: 2020-08-27

## 2020-08-25 RX ADMIN — HYDROMORPHONE HYDROCHLORIDE 2 MILLIGRAM(S): 2 INJECTION INTRAMUSCULAR; INTRAVENOUS; SUBCUTANEOUS at 04:28

## 2020-08-25 RX ADMIN — Medication 102 MILLIGRAM(S): at 06:44

## 2020-08-25 RX ADMIN — HYDROMORPHONE HYDROCHLORIDE 2 MILLIGRAM(S): 2 INJECTION INTRAMUSCULAR; INTRAVENOUS; SUBCUTANEOUS at 08:11

## 2020-08-25 RX ADMIN — GABAPENTIN 300 MILLIGRAM(S): 400 CAPSULE ORAL at 17:14

## 2020-08-25 RX ADMIN — LIDOCAINE 1 PATCH: 4 CREAM TOPICAL at 06:47

## 2020-08-25 RX ADMIN — MORPHINE SULFATE 4 MILLIGRAM(S): 50 CAPSULE, EXTENDED RELEASE ORAL at 11:17

## 2020-08-25 RX ADMIN — HYDROMORPHONE HYDROCHLORIDE 2 MILLIGRAM(S): 2 INJECTION INTRAMUSCULAR; INTRAVENOUS; SUBCUTANEOUS at 00:15

## 2020-08-25 RX ADMIN — HYDROMORPHONE HYDROCHLORIDE 2 MILLIGRAM(S): 2 INJECTION INTRAMUSCULAR; INTRAVENOUS; SUBCUTANEOUS at 04:08

## 2020-08-25 RX ADMIN — HYDROMORPHONE HYDROCHLORIDE 2 MILLIGRAM(S): 2 INJECTION INTRAMUSCULAR; INTRAVENOUS; SUBCUTANEOUS at 20:18

## 2020-08-25 RX ADMIN — MORPHINE SULFATE 4 MILLIGRAM(S): 50 CAPSULE, EXTENDED RELEASE ORAL at 17:14

## 2020-08-25 RX ADMIN — HYDROMORPHONE HYDROCHLORIDE 2 MILLIGRAM(S): 2 INJECTION INTRAMUSCULAR; INTRAVENOUS; SUBCUTANEOUS at 13:20

## 2020-08-25 RX ADMIN — HYDROMORPHONE HYDROCHLORIDE 2 MILLIGRAM(S): 2 INJECTION INTRAMUSCULAR; INTRAVENOUS; SUBCUTANEOUS at 19:43

## 2020-08-25 RX ADMIN — HYDROMORPHONE HYDROCHLORIDE 2 MILLIGRAM(S): 2 INJECTION INTRAMUSCULAR; INTRAVENOUS; SUBCUTANEOUS at 09:11

## 2020-08-25 RX ADMIN — MORPHINE SULFATE 4 MILLIGRAM(S): 50 CAPSULE, EXTENDED RELEASE ORAL at 12:17

## 2020-08-25 RX ADMIN — SODIUM CHLORIDE 75 MILLILITER(S): 9 INJECTION, SOLUTION INTRAVENOUS at 00:00

## 2020-08-25 RX ADMIN — PANTOPRAZOLE SODIUM 40 MILLIGRAM(S): 20 TABLET, DELAYED RELEASE ORAL at 18:40

## 2020-08-25 RX ADMIN — MORPHINE SULFATE 4 MILLIGRAM(S): 50 CAPSULE, EXTENDED RELEASE ORAL at 17:54

## 2020-08-25 RX ADMIN — HYDROMORPHONE HYDROCHLORIDE 2 MILLIGRAM(S): 2 INJECTION INTRAMUSCULAR; INTRAVENOUS; SUBCUTANEOUS at 13:27

## 2020-08-25 NOTE — PROGRESS NOTE ADULT - SUBJECTIVE AND OBJECTIVE BOX
Patient seen and examined at bedside. Pain well controlled. Denies nausea/vomiting.    No Known Drug Allergies  shellfish (Short breath)      Vital Signs Last 24 Hrs  T(C): 36.3 (25 Aug 2020 06:44), Max: 36.8 (24 Aug 2020 23:55)  T(F): 97.4 (25 Aug 2020 06:44), Max: 98.2 (24 Aug 2020 23:55)  HR: 75 (25 Aug 2020 06:44) (68 - 87)  BP: 175/94 (25 Aug 2020 06:44) (166/101 - 189/106)  BP(mean): --  RR: 16 (25 Aug 2020 06:44) (16 - 18)  SpO2: 95% (25 Aug 2020 06:44) (95% - 98%)    I&O's Detail    24 Aug 2020 07:01  -  25 Aug 2020 07:00  --------------------------------------------------------  IN:  Total IN: 0 mL    OUT:    Voided: 600 mL  Total OUT: 600 mL    Total NET: -600 mL          PE:   Gen: NAD  Spine:   Motor:                   C5                C6              C7               C8           T1   R            5/5                5/5            5/5             5/5          5/5  L             5/5               5/5             5/5             5/5          5/5                L2             L3             L4               L5            S1  R         4/5           5/5          4/5             4/5           5/5  L          4/5          5/5           4/5             4/5           5/5    Sensory:            C5         C6         C7      C8       T1        (0=absent, 1=impaired, 2=normal, NT=not testable)  R         2            2           2        2         2  L          2            2           2        2         2               L2          L3         L4      L5       S1         (0=absent, 1=impaired, 2=normal, NT=not testable)  R         2            2            2        2        2  L          1            1           2        2         2    DP 2+  Compartments soft  No calf ttp    A/P: 50yMale going to OR today for L2-3 PSF, L4-5 lami/discectomy     -NPO  -Hold AC  -OR today  -FU AM labs  -Pain control  -PT/OT: WBAT  -DVT ppx- SCDs Patient seen and examined at bedside. Pain well controlled. Denies nausea/vomiting.    No Known Drug Allergies  shellfish (Short breath)      Vital Signs Last 24 Hrs  T(C): 36.3 (25 Aug 2020 06:44), Max: 36.8 (24 Aug 2020 23:55)  T(F): 97.4 (25 Aug 2020 06:44), Max: 98.2 (24 Aug 2020 23:55)  HR: 75 (25 Aug 2020 06:44) (68 - 87)  BP: 175/94 (25 Aug 2020 06:44) (166/101 - 189/106)  BP(mean): --  RR: 16 (25 Aug 2020 06:44) (16 - 18)  SpO2: 95% (25 Aug 2020 06:44) (95% - 98%)    I&O's Detail    24 Aug 2020 07:01  -  25 Aug 2020 07:00  --------------------------------------------------------  IN:  Total IN: 0 mL    OUT:    Voided: 600 mL  Total OUT: 600 mL    Total NET: -600 mL          PE:   Gen: NAD  Spine:   Motor:                   C5                C6              C7               C8           T1   R            5/5                5/5            5/5             5/5          5/5  L             5/5               5/5             5/5             5/5          5/5                L2             L3             L4               L5            S1  R         4/5           5/5          5/5             5/5           5/5  L          3/5          3/5           4/5             4/5           5/5    Sensory:            C5         C6         C7      C8       T1        (0=absent, 1=impaired, 2=normal, NT=not testable)  R         2            2           2        2         2  L          2            2           2        2         2               L2          L3         L4      L5       S1         (0=absent, 1=impaired, 2=normal, NT=not testable)  R         2            2            2        2        2  L          1            1           2        2         2    DP 2+  Compartments soft  No calf ttp    A/P: 50yMale going to OR today for L2-3 PSF, L4-5 lami/discectomy   -NPO  -Hold AC  -OR today  -FU AM labs  -Pain control  -PT/OT: WBAT  -DVT ppx- SCDs

## 2020-08-25 NOTE — PROGRESS NOTE ADULT - SUBJECTIVE AND OBJECTIVE BOX
Patient is a 50y old  Male who presents with a chief complaint of low back pain (25 Aug 2020 07:14)      INTERVAL HPI/OVERNIGHT EVENTS:  pt is still in pain waiting for surgery  MEDICATIONS  (STANDING):  gabapentin 300 milliGRAM(s) Oral two times a day  lactated ringers. 1000 milliLiter(s) (75 mL/Hr) IV Continuous <Continuous>  melatonin 3 milliGRAM(s) Oral at bedtime  senna 2 Tablet(s) Oral at bedtime  sodium chloride 0.45%. 1000 milliLiter(s) (70 mL/Hr) IV Continuous <Continuous>    MEDICATIONS  (PRN):  HYDROmorphone  Injectable 2 milliGRAM(s) IV Push every 4 hours PRN Severe Pain (7 - 10)  morphine  - Injectable 4 milliGRAM(s) IV Push every 4 hours PRN Severe Pain (7 - 10)  oxycodone    5 mG/acetaminophen 325 mG 1 Tablet(s) Oral every 4 hours PRN Moderate Pain (4 - 6)      Allergies    No Known Drug Allergies  shellfish (Short breath)    Intolerances        REVIEW OF SYSTEMS:  CONSTITUTIONAL: No fever, weight loss, or fatigue  EYES: No eye pain, visual disturbances, or discharge  ENMT:  No difficulty hearing, tinnitus, vertigo; No sinus or throat pain  NECK: No pain or stiffness  BREASTS: No pain, masses, or nipple discharge  RESPIRATORY: No cough, wheezing, chills or hemoptysis; No shortness of breath  CARDIOVASCULAR: No chest pain, palpitations, dizziness, or leg swelling  GASTROINTESTINAL: No abdominal or epigastric pain. No nausea, vomiting, or hematemesis; No diarrhea or constipation. No melena or hematochezia.  GENITOURINARY: No dysuria, frequency, hematuria, or incontinence  NEUROLOGICAL: No headaches, memory loss, loss of strength, numbness, or tremors  SKIN: No itching, burning, rashes, or lesions   LYMPH NODES: No enlarged glands  ENDOCRINE: No heat or cold intolerance; No hair loss  MUSCULOSKELETAL: No joint pain or swelling; No muscle, back, or extremity pain  PSYCHIATRIC: No depression, anxiety, mood swings, or difficulty sleeping  HEME/LYMPH: No easy bruising, or bleeding gums  ALLERGY AND IMMUNOLOGIC: No hives or eczema    Vital Signs Last 24 Hrs  T(C): 36.7 (25 Aug 2020 11:08), Max: 36.8 (24 Aug 2020 23:55)  T(F): 98 (25 Aug 2020 11:08), Max: 98.2 (24 Aug 2020 23:55)  HR: 74 (25 Aug 2020 11:08) (68 - 87)  BP: 177/101 (25 Aug 2020 11:08) (166/101 - 189/106)  BP(mean): --  RR: 18 (25 Aug 2020 11:08) (16 - 18)  SpO2: 96% (25 Aug 2020 11:08) (95% - 98%)    PHYSICAL EXAM:  GENERAL: NAD, well-groomed, well-developed  HEAD:  Atraumatic, Normocephalic  EYES: EOMI, PERRLA, conjunctiva and sclera clear  ENMT: No tonsillar erythema, exudates, or enlargement; Moist mucous membranes, Good dentition, No lesions  NECK: Supple, No JVD, Normal thyroid  NERVOUS SYSTEM:  Alert & Oriented X3, Good concentration; Motor Strength 5/5 B/L upper and lower extremities; DTRs 2+ intact and symmetric  CHEST/LUNG: Clear to percussion bilaterally; No rales, rhonchi, wheezing, or rubs  HEART: Regular rate and rhythm; No murmurs, rubs, or gallops  ABDOMEN: Soft, Nontender, Nondistended; Bowel sounds present  EXTREMITIES:  2+ Peripheral Pulses, No clubbing, cyanosis, or edema  LYMPH: No lymphadenopathy noted  SKIN: No rashes or lesions    LABS:                        15.8   15.38 )-----------( 177      ( 25 Aug 2020 09:33 )             46.0     08-25    140  |  104  |  12  ----------------------------<  133<H>  4.2   |  29  |  0.78    Ca    8.8      25 Aug 2020 09:33      PT/INR - ( 25 Aug 2020 09:33 )   PT: 11.6 sec;   INR: 1.00 ratio         PTT - ( 25 Aug 2020 09:33 )  PTT:27.7 sec    CAPILLARY BLOOD GLUCOSE        CULTURES:    HEMOGLOBIN A1C:    CHOLESTEROL:        RADIOLOGY & ADDITIONAL TESTS:

## 2020-08-26 LAB
ANION GAP SERPL CALC-SCNC: 6 MMOL/L — SIGNIFICANT CHANGE UP (ref 5–17)
BUN SERPL-MCNC: 16 MG/DL — SIGNIFICANT CHANGE UP (ref 7–23)
CALCIUM SERPL-MCNC: 8.3 MG/DL — LOW (ref 8.5–10.1)
CHLORIDE SERPL-SCNC: 104 MMOL/L — SIGNIFICANT CHANGE UP (ref 96–108)
CO2 SERPL-SCNC: 29 MMOL/L — SIGNIFICANT CHANGE UP (ref 22–31)
CREAT SERPL-MCNC: 0.95 MG/DL — SIGNIFICANT CHANGE UP (ref 0.5–1.3)
GLUCOSE SERPL-MCNC: 109 MG/DL — HIGH (ref 70–99)
HCT VFR BLD CALC: 44.8 % — SIGNIFICANT CHANGE UP (ref 39–50)
HGB BLD-MCNC: 14.7 G/DL — SIGNIFICANT CHANGE UP (ref 13–17)
MCHC RBC-ENTMCNC: 32.2 PG — SIGNIFICANT CHANGE UP (ref 27–34)
MCHC RBC-ENTMCNC: 32.8 GM/DL — SIGNIFICANT CHANGE UP (ref 32–36)
MCV RBC AUTO: 98.2 FL — SIGNIFICANT CHANGE UP (ref 80–100)
NRBC # BLD: 0 /100 WBCS — SIGNIFICANT CHANGE UP (ref 0–0)
PLATELET # BLD AUTO: 154 K/UL — SIGNIFICANT CHANGE UP (ref 150–400)
POTASSIUM SERPL-MCNC: 4.5 MMOL/L — SIGNIFICANT CHANGE UP (ref 3.5–5.3)
POTASSIUM SERPL-SCNC: 4.5 MMOL/L — SIGNIFICANT CHANGE UP (ref 3.5–5.3)
RBC # BLD: 4.56 M/UL — SIGNIFICANT CHANGE UP (ref 4.2–5.8)
RBC # FLD: 11.7 % — SIGNIFICANT CHANGE UP (ref 10.3–14.5)
SODIUM SERPL-SCNC: 139 MMOL/L — SIGNIFICANT CHANGE UP (ref 135–145)
WBC # BLD: 15.57 K/UL — HIGH (ref 3.8–10.5)
WBC # FLD AUTO: 15.57 K/UL — HIGH (ref 3.8–10.5)

## 2020-08-26 RX ORDER — ONDANSETRON 8 MG/1
4 TABLET, FILM COATED ORAL ONCE
Refills: 0 | Status: DISCONTINUED | OUTPATIENT
Start: 2020-08-26 | End: 2020-08-26

## 2020-08-26 RX ORDER — OXYCODONE HYDROCHLORIDE 5 MG/1
5 TABLET ORAL EVERY 6 HOURS
Refills: 0 | Status: DISCONTINUED | OUTPATIENT
Start: 2020-08-26 | End: 2020-08-26

## 2020-08-26 RX ORDER — HYDROMORPHONE HYDROCHLORIDE 2 MG/ML
0.5 INJECTION INTRAMUSCULAR; INTRAVENOUS; SUBCUTANEOUS
Refills: 0 | Status: DISCONTINUED | OUTPATIENT
Start: 2020-08-26 | End: 2020-08-26

## 2020-08-26 RX ORDER — SODIUM CHLORIDE 9 MG/ML
1000 INJECTION, SOLUTION INTRAVENOUS
Refills: 0 | Status: DISCONTINUED | OUTPATIENT
Start: 2020-08-26 | End: 2020-08-26

## 2020-08-26 RX ORDER — LACTULOSE 10 G/15ML
10 SOLUTION ORAL ONCE
Refills: 0 | Status: COMPLETED | OUTPATIENT
Start: 2020-08-26 | End: 2020-08-26

## 2020-08-26 RX ORDER — HYDROMORPHONE HYDROCHLORIDE 2 MG/ML
1 INJECTION INTRAMUSCULAR; INTRAVENOUS; SUBCUTANEOUS
Refills: 0 | Status: DISCONTINUED | OUTPATIENT
Start: 2020-08-26 | End: 2020-08-26

## 2020-08-26 RX ORDER — HYDROMORPHONE HYDROCHLORIDE 2 MG/ML
1 INJECTION INTRAMUSCULAR; INTRAVENOUS; SUBCUTANEOUS ONCE
Refills: 0 | Status: DISCONTINUED | OUTPATIENT
Start: 2020-08-26 | End: 2020-08-26

## 2020-08-26 RX ORDER — HYDROMORPHONE HYDROCHLORIDE 2 MG/ML
2 INJECTION INTRAMUSCULAR; INTRAVENOUS; SUBCUTANEOUS EVERY 4 HOURS
Refills: 0 | Status: DISCONTINUED | OUTPATIENT
Start: 2020-08-26 | End: 2020-08-27

## 2020-08-26 RX ORDER — OXYCODONE HYDROCHLORIDE 5 MG/1
5 TABLET ORAL EVERY 4 HOURS
Refills: 0 | Status: DISCONTINUED | OUTPATIENT
Start: 2020-08-26 | End: 2020-08-27

## 2020-08-26 RX ORDER — OXYCODONE HYDROCHLORIDE 5 MG/1
10 TABLET ORAL EVERY 6 HOURS
Refills: 0 | Status: DISCONTINUED | OUTPATIENT
Start: 2020-08-26 | End: 2020-08-26

## 2020-08-26 RX ORDER — HYDROMORPHONE HYDROCHLORIDE 2 MG/ML
2 INJECTION INTRAMUSCULAR; INTRAVENOUS; SUBCUTANEOUS EVERY 4 HOURS
Refills: 0 | Status: DISCONTINUED | OUTPATIENT
Start: 2020-08-26 | End: 2020-08-26

## 2020-08-26 RX ORDER — ACETAMINOPHEN 500 MG
1000 TABLET ORAL ONCE
Refills: 0 | Status: COMPLETED | OUTPATIENT
Start: 2020-08-26 | End: 2020-08-26

## 2020-08-26 RX ORDER — METOPROLOL TARTRATE 50 MG
50 TABLET ORAL
Refills: 0 | Status: DISCONTINUED | OUTPATIENT
Start: 2020-08-26 | End: 2020-08-27

## 2020-08-26 RX ORDER — OXYCODONE HYDROCHLORIDE 5 MG/1
5 TABLET ORAL EVERY 4 HOURS
Refills: 0 | Status: DISCONTINUED | OUTPATIENT
Start: 2020-08-26 | End: 2020-08-26

## 2020-08-26 RX ORDER — SENNA PLUS 8.6 MG/1
2 TABLET ORAL AT BEDTIME
Refills: 0 | Status: DISCONTINUED | OUTPATIENT
Start: 2020-08-26 | End: 2020-08-27

## 2020-08-26 RX ORDER — LANOLIN ALCOHOL/MO/W.PET/CERES
5 CREAM (GRAM) TOPICAL AT BEDTIME
Refills: 0 | Status: DISCONTINUED | OUTPATIENT
Start: 2020-08-26 | End: 2020-08-26

## 2020-08-26 RX ORDER — OXYCODONE HYDROCHLORIDE 5 MG/1
10 TABLET ORAL EVERY 4 HOURS
Refills: 0 | Status: DISCONTINUED | OUTPATIENT
Start: 2020-08-26 | End: 2020-08-27

## 2020-08-26 RX ADMIN — Medication 1000 MILLIGRAM(S): at 01:15

## 2020-08-26 RX ADMIN — Medication 3 MILLIGRAM(S): at 23:40

## 2020-08-26 RX ADMIN — Medication 1 MILLIGRAM(S): at 11:34

## 2020-08-26 RX ADMIN — OXYCODONE HYDROCHLORIDE 10 MILLIGRAM(S): 5 TABLET ORAL at 08:11

## 2020-08-26 RX ADMIN — Medication 400 MILLIGRAM(S): at 21:59

## 2020-08-26 RX ADMIN — HYDROMORPHONE HYDROCHLORIDE 1 MILLIGRAM(S): 2 INJECTION INTRAMUSCULAR; INTRAVENOUS; SUBCUTANEOUS at 05:43

## 2020-08-26 RX ADMIN — Medication 500 MILLIGRAM(S): at 05:30

## 2020-08-26 RX ADMIN — OXYCODONE HYDROCHLORIDE 10 MILLIGRAM(S): 5 TABLET ORAL at 11:34

## 2020-08-26 RX ADMIN — Medication 1000 MILLIGRAM(S): at 22:20

## 2020-08-26 RX ADMIN — HYDROMORPHONE HYDROCHLORIDE 1 MILLIGRAM(S): 2 INJECTION INTRAMUSCULAR; INTRAVENOUS; SUBCUTANEOUS at 00:45

## 2020-08-26 RX ADMIN — Medication 1 TABLET(S): at 21:58

## 2020-08-26 RX ADMIN — HYDROMORPHONE HYDROCHLORIDE 1 MILLIGRAM(S): 2 INJECTION INTRAMUSCULAR; INTRAVENOUS; SUBCUTANEOUS at 04:23

## 2020-08-26 RX ADMIN — Medication 5 MILLIGRAM(S): at 23:40

## 2020-08-26 RX ADMIN — LACTULOSE 10 GRAM(S): 10 SOLUTION ORAL at 13:52

## 2020-08-26 RX ADMIN — Medication 100 MILLIGRAM(S): at 21:58

## 2020-08-26 RX ADMIN — HYDROMORPHONE HYDROCHLORIDE 2 MILLIGRAM(S): 2 INJECTION INTRAMUSCULAR; INTRAVENOUS; SUBCUTANEOUS at 09:06

## 2020-08-26 RX ADMIN — HYDROMORPHONE HYDROCHLORIDE 1 MILLIGRAM(S): 2 INJECTION INTRAMUSCULAR; INTRAVENOUS; SUBCUTANEOUS at 04:38

## 2020-08-26 RX ADMIN — OXYCODONE HYDROCHLORIDE 10 MILLIGRAM(S): 5 TABLET ORAL at 01:12

## 2020-08-26 RX ADMIN — HYDROMORPHONE HYDROCHLORIDE 2 MILLIGRAM(S): 2 INJECTION INTRAMUSCULAR; INTRAVENOUS; SUBCUTANEOUS at 13:22

## 2020-08-26 RX ADMIN — Medication 5 MILLIGRAM(S): at 17:44

## 2020-08-26 RX ADMIN — HYDROMORPHONE HYDROCHLORIDE 1 MILLIGRAM(S): 2 INJECTION INTRAMUSCULAR; INTRAVENOUS; SUBCUTANEOUS at 05:28

## 2020-08-26 RX ADMIN — SODIUM CHLORIDE 100 MILLILITER(S): 9 INJECTION, SOLUTION INTRAVENOUS at 01:07

## 2020-08-26 RX ADMIN — Medication 1 TABLET(S): at 13:56

## 2020-08-26 RX ADMIN — Medication 5 MILLIGRAM(S): at 10:02

## 2020-08-26 RX ADMIN — OXYCODONE HYDROCHLORIDE 10 MILLIGRAM(S): 5 TABLET ORAL at 07:30

## 2020-08-26 RX ADMIN — Medication 100 MILLIGRAM(S): at 13:56

## 2020-08-26 RX ADMIN — GABAPENTIN 300 MILLIGRAM(S): 400 CAPSULE ORAL at 17:44

## 2020-08-26 RX ADMIN — SODIUM CHLORIDE 75 MILLILITER(S): 9 INJECTION, SOLUTION INTRAVENOUS at 02:26

## 2020-08-26 RX ADMIN — HYDROMORPHONE HYDROCHLORIDE 1 MILLIGRAM(S): 2 INJECTION INTRAMUSCULAR; INTRAVENOUS; SUBCUTANEOUS at 01:00

## 2020-08-26 RX ADMIN — Medication 500 MILLIGRAM(S): at 17:44

## 2020-08-26 RX ADMIN — GABAPENTIN 300 MILLIGRAM(S): 400 CAPSULE ORAL at 05:30

## 2020-08-26 RX ADMIN — HYDROMORPHONE HYDROCHLORIDE 2 MILLIGRAM(S): 2 INJECTION INTRAMUSCULAR; INTRAVENOUS; SUBCUTANEOUS at 19:17

## 2020-08-26 RX ADMIN — HYDROMORPHONE HYDROCHLORIDE 2 MILLIGRAM(S): 2 INJECTION INTRAMUSCULAR; INTRAVENOUS; SUBCUTANEOUS at 14:00

## 2020-08-26 RX ADMIN — Medication 1 TABLET(S): at 05:30

## 2020-08-26 RX ADMIN — HYDROMORPHONE HYDROCHLORIDE 2 MILLIGRAM(S): 2 INJECTION INTRAMUSCULAR; INTRAVENOUS; SUBCUTANEOUS at 09:35

## 2020-08-26 RX ADMIN — Medication 1 TABLET(S): at 11:34

## 2020-08-26 RX ADMIN — MAGNESIUM HYDROXIDE 30 MILLILITER(S): 400 TABLET, CHEWABLE ORAL at 10:02

## 2020-08-26 RX ADMIN — Medication 400 MILLIGRAM(S): at 00:45

## 2020-08-26 RX ADMIN — SENNA PLUS 2 TABLET(S): 8.6 TABLET ORAL at 13:52

## 2020-08-26 RX ADMIN — OXYCODONE HYDROCHLORIDE 10 MILLIGRAM(S): 5 TABLET ORAL at 15:58

## 2020-08-26 RX ADMIN — Medication 100 MILLIGRAM(S): at 05:28

## 2020-08-26 NOTE — DISCHARGE NOTE PROVIDER - HOSPITAL COURSE
Pt is a 50 year old male w/PMH of cervical spine fusion, who presents to the ED today for 2 days of lower left back pain. Pt states pain radiates down left leg. Pt went for surgery.

## 2020-08-26 NOTE — PHYSICAL THERAPY INITIAL EVALUATION ADULT - GENERAL OBSERVATIONS, REHAB EVAL
Pt encountered seated at edge of bed, alert and oriented x4, dressing to low back c/d/i, with RASHEED drain in place, NAD. Pt is POD#1 laminectomy/discectomy L2-L3, WBAT.

## 2020-08-26 NOTE — PROGRESS NOTE ADULT - SUBJECTIVE AND OBJECTIVE BOX
Patient is a 50y old  Male who presents with a chief complaint of low back pain (26 Aug 2020 10:37)      INTERVAL HPI/OVERNIGHT EVENTS:  pt is doing better  MEDICATIONS  (STANDING):  ascorbic acid 500 milliGRAM(s) Oral two times a day  calcium carbonate 1250 mG  + Vitamin D (OsCal 500 + D) 1 Tablet(s) Oral three times a day  ceFAZolin   IVPB 2000 milliGRAM(s) IV Intermittent every 8 hours  folic acid 1 milliGRAM(s) Oral daily  gabapentin 300 milliGRAM(s) Oral two times a day  multivitamin 1 Tablet(s) Oral daily    MEDICATIONS  (PRN):  acetaminophen   Tablet .. 650 milliGRAM(s) Oral every 6 hours PRN Temp greater or equal to 38C (100.4F)  benzocaine 15 mG/menthol 3.6 mG (Sugar-Free) Lozenge 1 Lozenge Oral every 2 hours PRN Sore Throat  diazepam    Tablet 5 milliGRAM(s) Oral every 6 hours PRN muscle spasm  famotidine    Tablet 20 milliGRAM(s) Oral every 12 hours PRN Dyspepsia  HYDROmorphone  Injectable 2 milliGRAM(s) IV Push every 4 hours PRN Severe Pain (7 - 10)  magnesium hydroxide Suspension 30 milliLiter(s) Oral every 12 hours PRN Constipation  melatonin 3 milliGRAM(s) Oral at bedtime PRN Insomnia  ondansetron Injectable 4 milliGRAM(s) IV Push every 6 hours PRN Nausea and/or Vomiting  oxyCODONE    IR 10 milliGRAM(s) Oral every 4 hours PRN Moderate Pain (4 - 6)  oxyCODONE    IR 5 milliGRAM(s) Oral every 4 hours PRN Mild Pain (1 - 3)      Allergies    No Known Drug Allergies  shellfish (Short breath)    Intolerances        REVIEW OF SYSTEMS:  CONSTITUTIONAL: No fever, weight loss, or fatigue  EYES: No eye pain, visual disturbances, or discharge  ENMT:  No difficulty hearing, tinnitus, vertigo; No sinus or throat pain  NECK: No pain or stiffness  BREASTS: No pain, masses, or nipple discharge  RESPIRATORY: No cough, wheezing, chills or hemoptysis; No shortness of breath  CARDIOVASCULAR: No chest pain, palpitations, dizziness, or leg swelling  GASTROINTESTINAL: No abdominal or epigastric pain. No nausea, vomiting, or hematemesis; No diarrhea or constipation. No melena or hematochezia.  GENITOURINARY: No dysuria, frequency, hematuria, or incontinence  NEUROLOGICAL: No headaches, memory loss, loss of strength, numbness, or tremors  SKIN: No itching, burning, rashes, or lesions   LYMPH NODES: No enlarged glands  ENDOCRINE: No heat or cold intolerance; No hair loss  MUSCULOSKELETAL: No joint pain or swelling; No muscle, back, or extremity pain  PSYCHIATRIC: No depression, anxiety, mood swings, or difficulty sleeping  HEME/LYMPH: No easy bruising, or bleeding gums  ALLERGY AND IMMUNOLOGIC: No hives or eczema    Vital Signs Last 24 Hrs  T(C): 37.1 (26 Aug 2020 11:05), Max: 37.1 (26 Aug 2020 01:20)  T(F): 98.8 (26 Aug 2020 11:05), Max: 98.8 (26 Aug 2020 01:20)  HR: 80 (26 Aug 2020 11:05) (71 - 93)  BP: 160/90 (26 Aug 2020 11:05) (137/76 - 186/90)  BP(mean): --  RR: 18 (26 Aug 2020 11:05) (13 - 22)  SpO2: 98% (26 Aug 2020 11:05) (94% - 100%)    PHYSICAL EXAM:  GENERAL: NAD, well-groomed, well-developed  HEAD:  Atraumatic, Normocephalic  EYES: EOMI, PERRLA, conjunctiva and sclera clear  ENMT: No tonsillar erythema, exudates, or enlargement; Moist mucous membranes, Good dentition, No lesions  NECK: Supple, No JVD, Normal thyroid  NERVOUS SYSTEM:  Alert & Oriented X3, Good concentration; Motor Strength 5/5 B/L upper and lower extremities; DTRs 2+ intact and symmetric  CHEST/LUNG: Clear to percussion bilaterally; No rales, rhonchi, wheezing, or rubs  HEART: Regular rate and rhythm; No murmurs, rubs, or gallops  ABDOMEN: Soft, Nontender, Nondistended; Bowel sounds present  EXTREMITIES:  2+ Peripheral Pulses, No clubbing, cyanosis, or edema  LYMPH: No lymphadenopathy noted  SKIN: No rashes or lesions    LABS:                        14.7   15.57 )-----------( 154      ( 26 Aug 2020 08:33 )             44.8     08-26    139  |  104  |  16  ----------------------------<  109<H>  4.5   |  29  |  0.95    Ca    8.3<L>      26 Aug 2020 08:33      PT/INR - ( 25 Aug 2020 09:33 )   PT: 11.6 sec;   INR: 1.00 ratio         PTT - ( 25 Aug 2020 09:33 )  PTT:27.7 sec    CAPILLARY BLOOD GLUCOSE        CULTURES:    HEMOGLOBIN A1C:    CHOLESTEROL:        RADIOLOGY & ADDITIONAL TESTS:

## 2020-08-26 NOTE — BRIEF OPERATIVE NOTE - NSICDXBRIEFPROCEDURE_GEN_ALL_CORE_FT
PROCEDURES:  Laminectomy, spine, lumbar, with discectomy and foraminotomy 26-Aug-2020 00:27:48 L2/3 Jerman Richter

## 2020-08-26 NOTE — DISCHARGE NOTE PROVIDER - NSDCCPCAREPLAN_GEN_ALL_CORE_FT
PRINCIPAL DISCHARGE DIAGNOSIS  Diagnosis: Lumbar disc herniation  Assessment and Plan of Treatment: L2/3 with radiculopathy      SECONDARY DISCHARGE DIAGNOSES  Diagnosis: Disc disorder of lumbar region  Assessment and Plan of Treatment:

## 2020-08-26 NOTE — PROGRESS NOTE ADULT - SUBJECTIVE AND OBJECTIVE BOX
Patient seen and examined this AM POD 1 from Laminectomy / discectomy L2/3. Doing well, denies any numbness/tingling and reports he was able to walk with less difficulty to bathroom this morning compared to before surgery. Denies any CP/SOB. Denies nausea/vomiting.      Vital Signs Last 24 Hrs  T(C): 36.8 (26 Aug 2020 04:51), Max: 37.1 (26 Aug 2020 01:20)  T(F): 98.3 (26 Aug 2020 04:51), Max: 98.8 (26 Aug 2020 01:20)  HR: 81 (26 Aug 2020 04:51) (72 - 93)  BP: 137/76 (26 Aug 2020 04:51) (137/76 - 186/90)  BP(mean): --  RR: 16 (26 Aug 2020 04:51) (13 - 22)  SpO2: 99% (26 Aug 2020 04:51) (94% - 100%)    LABS:                        15.8   15.38 )-----------( 177      ( 25 Aug 2020 09:33 )             46.0     25 Aug 2020 09:33    140    |  104    |  12     ----------------------------<  133    4.2     |  29     |  0.78     Ca    8.8        25 Aug 2020 09:33      PT/INR - ( 25 Aug 2020 09:33 )   PT: 11.6 sec;   INR: 1.00 ratio         PTT - ( 25 Aug 2020 09:33 )  PTT:27.7 sec          PE:   Gen: NAD  Incision C/D/I  RASHEED drain in place  Spine:   Motor:                   C5                C6              C7               C8           T1   R            5/5                5/5            5/5             5/5          5/5  L             5/5               5/5             5/5             5/5          5/5                L2             L3             L4               L5            S1  R         5/5           5/5          5/5             5/5           5/5  L          4+/5         5/5        5/5          5/5           5/5    Sensory:            C5         C6         C7      C8       T1        (0=absent, 1=impaired, 2=normal, NT=not testable)  R         2            2           2        2         2  L          2            2           2        2         2               L2          L3         L4      L5       S1         (0=absent, 1=impaired, 2=normal, NT=not testable)  R         2            2            2        2        2  L          1            1           2        2         2    DP 2+  Compartments soft  No calf ttp    A/P: 50y Male POD 1 from L2-3 lami/discectomy     -ABx while drain in place  -FU AM labs  -Pain control  -PT/OT: WBAT  -HOLD ALL CHEMICAL DVT ppx  -SCDs OK  -Discharge planning when cleared by PT

## 2020-08-26 NOTE — PHYSICAL THERAPY INITIAL EVALUATION ADULT - BALANCE TRAINING, PT EVAL
pt will increase sitting and standing dynamic balance by full grade for safety with ambulation, ADLs and transfers x6 weeks

## 2020-08-26 NOTE — PHYSICAL THERAPY INITIAL EVALUATION ADULT - PERTINENT HX OF CURRENT PROBLEM, REHAB EVAL
Pt is a 50 year old male w/PMH of cervical spine fusion, who presents to the ED today for 2 days of lower left back pain. Pt states pain radiates down left leg.

## 2020-08-26 NOTE — PHYSICAL THERAPY INITIAL EVALUATION ADULT - ADDITIONAL COMMENTS
Pt lives in a private house with 5 steps to enter with B wide handrails, no further steps to negotiate once inside. Pt is R hand dominant, wears glasses, walk in shower with regular height toilet. No previous PT, no reported falls or buckling. 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

## 2020-08-26 NOTE — DISCHARGE NOTE PROVIDER - NSDCCPTREATMENT_GEN_ALL_CORE_FT
PRINCIPAL PROCEDURE  Procedure: Laminectomy, spine, lumbar, with discectomy, 1 level  Findings and Treatment: L2/3  1. Pain Control: take pain medications as needed and as prescribed  2. Full weight bearing as tolerated, with assistive devices (walker/cane) as needed  3. NO CHEMICAL DVT Prophylaxis: See Med Rec for details.  4. Physical therapy as needed  5. Follow up with Dr. Faulkner as Outpatient in 10-14 Days after discharge from the Hospital or Rehab. Please call office for appointment.  6.  Remove Dressing Post-Op Day 10, with Daily Dressing Changes as Need.   7. Ice affected area as Needed  8. Keep Dressing  Clean and dry

## 2020-08-26 NOTE — DISCHARGE NOTE PROVIDER - NSDCMRMEDTOKEN_GEN_ALL_CORE_FT
metoprolol tartrate 50 mg oral tablet: 1 tab(s) orally 2 times a day ascorbic acid 500 mg oral tablet: 1 tab(s) orally 2 times a day  Augmentin 875 mg-125 mg oral tablet: 1 tab(s) orally every 12 hours   folic acid 1 mg oral tablet: 1 tab(s) orally once a day  gabapentin 300 mg oral capsule: 1 cap(s) orally 2 times a day  metoprolol tartrate 50 mg oral tablet: 1 tab(s) orally 2 times a day  Multiple Vitamins oral tablet: 1 tab(s) orally once a day  oxycodone-acetaminophen 5 mg-325 mg oral tablet: 1 tab(s) orally every 4 hours, As needed, Moderate Pain (4 - 6) MDD:6

## 2020-08-26 NOTE — DISCHARGE NOTE PROVIDER - CARE PROVIDER_API CALL
Adrienne Faulkner  ORTHOPAEDIC SURGERY  [address] [facility], [place] [address]  Phone: [phone]  Fax: [phone]  Follow Up Time:

## 2020-08-27 VITALS
HEART RATE: 88 BPM | DIASTOLIC BLOOD PRESSURE: 87 MMHG | TEMPERATURE: 99 F | OXYGEN SATURATION: 98 % | RESPIRATION RATE: 18 BRPM | SYSTOLIC BLOOD PRESSURE: 149 MMHG

## 2020-08-27 LAB
ANION GAP SERPL CALC-SCNC: 4 MMOL/L — LOW (ref 5–17)
BUN SERPL-MCNC: 13 MG/DL — SIGNIFICANT CHANGE UP (ref 7–23)
CALCIUM SERPL-MCNC: 7.6 MG/DL — LOW (ref 8.5–10.1)
CHLORIDE SERPL-SCNC: 101 MMOL/L — SIGNIFICANT CHANGE UP (ref 96–108)
CO2 SERPL-SCNC: 31 MMOL/L — SIGNIFICANT CHANGE UP (ref 22–31)
CREAT SERPL-MCNC: 0.7 MG/DL — SIGNIFICANT CHANGE UP (ref 0.5–1.3)
GLUCOSE SERPL-MCNC: 91 MG/DL — SIGNIFICANT CHANGE UP (ref 70–99)
HCT VFR BLD CALC: 42.6 % — SIGNIFICANT CHANGE UP (ref 39–50)
HGB BLD-MCNC: 14.3 G/DL — SIGNIFICANT CHANGE UP (ref 13–17)
MCHC RBC-ENTMCNC: 31.9 PG — SIGNIFICANT CHANGE UP (ref 27–34)
MCHC RBC-ENTMCNC: 33.6 GM/DL — SIGNIFICANT CHANGE UP (ref 32–36)
MCV RBC AUTO: 95.1 FL — SIGNIFICANT CHANGE UP (ref 80–100)
NRBC # BLD: 0 /100 WBCS — SIGNIFICANT CHANGE UP (ref 0–0)
PLATELET # BLD AUTO: 125 K/UL — LOW (ref 150–400)
POTASSIUM SERPL-MCNC: 3.6 MMOL/L — SIGNIFICANT CHANGE UP (ref 3.5–5.3)
POTASSIUM SERPL-SCNC: 3.6 MMOL/L — SIGNIFICANT CHANGE UP (ref 3.5–5.3)
RBC # BLD: 4.48 M/UL — SIGNIFICANT CHANGE UP (ref 4.2–5.8)
RBC # FLD: 11.4 % — SIGNIFICANT CHANGE UP (ref 10.3–14.5)
SODIUM SERPL-SCNC: 136 MMOL/L — SIGNIFICANT CHANGE UP (ref 135–145)
WBC # BLD: 10.21 K/UL — SIGNIFICANT CHANGE UP (ref 3.8–10.5)
WBC # FLD AUTO: 10.21 K/UL — SIGNIFICANT CHANGE UP (ref 3.8–10.5)

## 2020-08-27 RX ORDER — METOPROLOL TARTRATE 50 MG
1 TABLET ORAL
Qty: 60 | Refills: 0
Start: 2020-08-27 | End: 2020-09-25

## 2020-08-27 RX ORDER — GABAPENTIN 400 MG/1
1 CAPSULE ORAL
Qty: 30 | Refills: 0
Start: 2020-08-27 | End: 2020-09-10

## 2020-08-27 RX ORDER — OXYCODONE AND ACETAMINOPHEN 5; 325 MG/1; MG/1
1 TABLET ORAL EVERY 4 HOURS
Refills: 0 | Status: DISCONTINUED | OUTPATIENT
Start: 2020-08-27 | End: 2020-08-27

## 2020-08-27 RX ORDER — METOPROLOL TARTRATE 50 MG
1 TABLET ORAL
Qty: 0 | Refills: 0 | DISCHARGE

## 2020-08-27 RX ORDER — ASCORBIC ACID 60 MG
1 TABLET,CHEWABLE ORAL
Qty: 60 | Refills: 0
Start: 2020-08-27 | End: 2020-09-25

## 2020-08-27 RX ORDER — FOLIC ACID 0.8 MG
1 TABLET ORAL
Qty: 30 | Refills: 0
Start: 2020-08-27 | End: 2020-09-25

## 2020-08-27 RX ADMIN — HYDROMORPHONE HYDROCHLORIDE 2 MILLIGRAM(S): 2 INJECTION INTRAMUSCULAR; INTRAVENOUS; SUBCUTANEOUS at 05:01

## 2020-08-27 RX ADMIN — HYDROMORPHONE HYDROCHLORIDE 2 MILLIGRAM(S): 2 INJECTION INTRAMUSCULAR; INTRAVENOUS; SUBCUTANEOUS at 12:59

## 2020-08-27 RX ADMIN — GABAPENTIN 300 MILLIGRAM(S): 400 CAPSULE ORAL at 05:01

## 2020-08-27 RX ADMIN — Medication 100 MILLIGRAM(S): at 05:01

## 2020-08-27 RX ADMIN — Medication 1 MILLIGRAM(S): at 11:55

## 2020-08-27 RX ADMIN — Medication 500 MILLIGRAM(S): at 05:01

## 2020-08-27 RX ADMIN — HYDROMORPHONE HYDROCHLORIDE 2 MILLIGRAM(S): 2 INJECTION INTRAMUSCULAR; INTRAVENOUS; SUBCUTANEOUS at 05:30

## 2020-08-27 RX ADMIN — HYDROMORPHONE HYDROCHLORIDE 2 MILLIGRAM(S): 2 INJECTION INTRAMUSCULAR; INTRAVENOUS; SUBCUTANEOUS at 09:03

## 2020-08-27 RX ADMIN — Medication 1 TABLET(S): at 11:55

## 2020-08-27 RX ADMIN — Medication 50 MILLIGRAM(S): at 05:01

## 2020-08-27 RX ADMIN — HYDROMORPHONE HYDROCHLORIDE 2 MILLIGRAM(S): 2 INJECTION INTRAMUSCULAR; INTRAVENOUS; SUBCUTANEOUS at 13:14

## 2020-08-27 RX ADMIN — HYDROMORPHONE HYDROCHLORIDE 2 MILLIGRAM(S): 2 INJECTION INTRAMUSCULAR; INTRAVENOUS; SUBCUTANEOUS at 01:10

## 2020-08-27 RX ADMIN — Medication 1 TABLET(S): at 05:01

## 2020-08-27 RX ADMIN — HYDROMORPHONE HYDROCHLORIDE 2 MILLIGRAM(S): 2 INJECTION INTRAMUSCULAR; INTRAVENOUS; SUBCUTANEOUS at 09:18

## 2020-08-27 NOTE — PROGRESS NOTE ADULT - SUBJECTIVE AND OBJECTIVE BOX
Patient seen and examined this AM POD 2 from Laminectomy / discectomy L2/3. Doing well, denies any numbness/tingling and has been walking well with walker since surgery. Denies Vital Signs Last 24 Hrs  T(C): 36.8 (27 Aug 2020 05:19), Max: 37.4 (26 Aug 2020 18:13)  T(F): 98.2 (27 Aug 2020 05:19), Max: 99.4 (26 Aug 2020 18:13)  HR: 76 (27 Aug 2020 05:19) (70 - 80)  BP: 145/90 (27 Aug 2020 05:19) (145/90 - 169/91)  BP(mean): --  RR: 18 (27 Aug 2020 05:19) (17 - 18)  SpO2: 95% (27 Aug 2020 05:19) (95% - 98%)any CP/SOB. Denies nausea/vomiting. Reports he would like to go home ASAP today    LABS:                        14.7   15.57 )-----------( 154      ( 26 Aug 2020 08:33 )             44.8     26 Aug 2020 08:33    139    |  104    |  16     ----------------------------<  109    4.5     |  29     |  0.95     Ca    8.3        26 Aug 2020 08:33      PT/INR - ( 25 Aug 2020 09:33 )   PT: 11.6 sec;   INR: 1.00 ratio         PTT - ( 25 Aug 2020 09:33 )  PTT:27.7 sec      PE:   Gen: NAD  Incision C/D/I  RASHEED drain in place  Spine:   Motor:                   C5                C6              C7               C8           T1   R            5/5                5/5            5/5             5/5          5/5  L             5/5               5/5             5/5             5/5          5/5                L2             L3             L4               L5            S1  R         5/5           5/5          5/5             5/5           5/5  L          5/5         5/5        5/5          5/5           5/5    Sensory:            C5         C6         C7      C8       T1        (0=absent, 1=impaired, 2=normal, NT=not testable)  R         2            2           2        2         2  L          2            2           2        2         2               L2          L3         L4      L5       S1         (0=absent, 1=impaired, 2=normal, NT=not testable)  R         2            2            2        2        2  L          1            1           2        2         2    DP 2+  Compartments soft  No calf ttp    A/P: 50y Male POD 1 from L2-3 lami/discectomy     -Continue primary management by medicine   -ABx while drain in place  -FU AM labs  -Pain control  -PT/OT: WBAT  -HOLD ALL CHEMICAL DVT ppx  -SCDs OK  -Discharge planning when cleared by PT

## 2020-08-27 NOTE — PROGRESS NOTE ADULT - ATTENDING COMMENTS
I reexamined the patient with Dr. Mcmanus one more time.  Patient has had IV steroids for 2 days now.   Patient's pain has had improvement as expected but unfortunately left leg is still markedly weak.  His weakness in the left leg is primarily in hip flexion and quadricep strength.  His hip monae and left leg monae at the level of knee.  He does not have weakness in dorsiflexion at all and is 5/5.      I had the patient try to walk, and even with the walker the hip monae on left side and knee monae due to weakness.  Ankle function remains steady.    His numbness is all medial thigh and anterior thigh in left leg (L2/3 pattern) and not on the lateral aspect of leg which would be from L4/5.    More over, he has a severly positive femoral stretch exam correlating with L2/3 but he has a negative straight leg raise which means L4/5 is not symptomatic classically.    I discussed with him role of doing both levels where he has problem in lumbar spine.  Large extruded herniation at L2/3 is 2.5cm long and is covering  50% of spinal at that level and I believe is causing wide majority of his symptoms.  His L4/5 herniation is minimal in central canal but is moderate in foramen.  I also reviewed CT scan which does not any large calcification at L2/3 and correlating that this is acute herniation.      In addition, I reviewed his urinary symptoms.  He has had urinary urgency from the cervical injury and has had two surgeries for cervical spine.  Residual symptoms are expected to persist.  He however reports increase numbness at the tip of his penis that he reports is new.  That I believe can improve.  His herniation is just at the tip of conus and therefore, he can have increased numbness from a large herniation at the L2/3 level which he has however his chronic urinary urgency is not expected to improve.    I discussed with him that he may need L4/5 surgery in future.  My original plan was to address both levels, however after his follow up examination where all the symptoms are essentially L2/3 pattern and to limit number of surgical levels to leave patient with most mobility of his lumbar spine, we will address the worst level.  L4/5 herniation is small.  This level is not next to his L2/3 level and it's quiet likely his chronic mild back pain that he had for last few years is from degenerative L4/5 level but his severe symptoms will be addressed with the surgery.  I also discussed with him, he will likely need fusion, as his herniation is very large and is essentially at the tip of conus, so I do not want to mobilize the nerve excessively to avoid injury to lower spinal cord.  All questions discussed.  patient wishes to proceed.
Patient was examined by senior resident 08/24/20.  He attempted to walk the patient but left leg weakness persists.  His pain has improved but weakness in the leg is not improving.  He informed me that he is not able to walk or stand on left leg and the leg monae.  He noticed significant weakness in his flexors and quad strength.  Patient is also noticing increased numbness in the tip of his penis.  He has had urinary urgency for multiple years since his neck surgery but numbness in the tip of penis is increased.      I informed them that I will reevaluate him one more time on 08/25 and if he is not doing well and strength is not improving we will decompress the lumbar spine at L2/3 level and possibly at L4/5 level.

## 2020-08-27 NOTE — DISCHARGE NOTE NURSING/CASE MANAGEMENT/SOCIAL WORK - PATIENT PORTAL LINK FT
You can access the FollowMyHealth Patient Portal offered by [facility] by registering at the following website: [email or web address]. By joining [employer]’s FollowMyHealth portal, you will also be able to view your health information using other applications (apps) compatible with our system.

## 2024-07-11 NOTE — DISCHARGE NOTE NURSING/CASE MANAGEMENT/SOCIAL WORK - NSDPACMPNY_GEN_ALL_CORE
Patient called requesting a refill for [place] to be sent to [facility] on [address] Patient states sent a [employer] message on 7/8 for a refill and [facility] was not [facility] [address]. This MA repended this medication to the provider to be signed.    Other (Specify)